# Patient Record
Sex: FEMALE | Race: WHITE | Employment: FULL TIME | URBAN - METROPOLITAN AREA
[De-identification: names, ages, dates, MRNs, and addresses within clinical notes are randomized per-mention and may not be internally consistent; named-entity substitution may affect disease eponyms.]

---

## 2024-11-01 ENCOUNTER — ANESTHESIA (OUTPATIENT)
Dept: OPERATING ROOM | Age: 54
End: 2024-11-01
Payer: COMMERCIAL

## 2024-11-01 ENCOUNTER — ANESTHESIA EVENT (OUTPATIENT)
Dept: OPERATING ROOM | Age: 54
End: 2024-11-01
Payer: COMMERCIAL

## 2024-11-01 ENCOUNTER — HOSPITAL ENCOUNTER (OUTPATIENT)
Age: 54
Setting detail: OUTPATIENT SURGERY
Discharge: HOME | End: 2024-11-01
Attending: PODIATRIST | Admitting: PODIATRIST
Payer: COMMERCIAL

## 2024-11-01 VITALS
TEMPERATURE: 97 F | HEIGHT: 64 IN | BODY MASS INDEX: 34.15 KG/M2 | RESPIRATION RATE: 14 BRPM | DIASTOLIC BLOOD PRESSURE: 64 MMHG | OXYGEN SATURATION: 96 % | SYSTOLIC BLOOD PRESSURE: 113 MMHG | WEIGHT: 200 LBS | HEART RATE: 62 BPM

## 2024-11-01 DIAGNOSIS — Z98.890 S/P FOOT SURGERY, LEFT: Primary | ICD-10-CM

## 2024-11-01 PROCEDURE — 2720000010 HC SURG SUPPLY STERILE: Performed by: PODIATRIST

## 2024-11-01 PROCEDURE — 2580000003 HC RX 258

## 2024-11-01 PROCEDURE — A4217 STERILE WATER/SALINE, 500 ML: HCPCS | Performed by: PODIATRIST

## 2024-11-01 PROCEDURE — 7100000010 HC PHASE II RECOVERY - FIRST 15 MIN: Performed by: PODIATRIST

## 2024-11-01 PROCEDURE — 3700000000 HC ANESTHESIA ATTENDED CARE: Performed by: PODIATRIST

## 2024-11-01 PROCEDURE — 7100000011 HC PHASE II RECOVERY - ADDTL 15 MIN: Performed by: PODIATRIST

## 2024-11-01 PROCEDURE — 6360000002 HC RX W HCPCS: Performed by: ANESTHESIOLOGY

## 2024-11-01 PROCEDURE — 2580000003 HC RX 258: Performed by: PODIATRIST

## 2024-11-01 PROCEDURE — 3700000001 HC ADD 15 MINUTES (ANESTHESIA): Performed by: PODIATRIST

## 2024-11-01 PROCEDURE — 3600000003 HC SURGERY LEVEL 3 BASE: Performed by: PODIATRIST

## 2024-11-01 PROCEDURE — 6360000002 HC RX W HCPCS: Performed by: PODIATRIST

## 2024-11-01 PROCEDURE — L4361 PNEUMA/VAC WALK BOOT PRE OTS: HCPCS | Performed by: PODIATRIST

## 2024-11-01 PROCEDURE — 6360000002 HC RX W HCPCS

## 2024-11-01 PROCEDURE — 2709999900 HC NON-CHARGEABLE SUPPLY: Performed by: PODIATRIST

## 2024-11-01 PROCEDURE — 2500000003 HC RX 250 WO HCPCS: Performed by: PODIATRIST

## 2024-11-01 PROCEDURE — 3600000013 HC SURGERY LEVEL 3 ADDTL 15MIN: Performed by: PODIATRIST

## 2024-11-01 RX ORDER — NALOXONE HYDROCHLORIDE 0.4 MG/ML
INJECTION, SOLUTION INTRAMUSCULAR; INTRAVENOUS; SUBCUTANEOUS PRN
Status: DISCONTINUED | OUTPATIENT
Start: 2024-11-01 | End: 2024-11-01 | Stop reason: HOSPADM

## 2024-11-01 RX ORDER — ATORVASTATIN CALCIUM 10 MG/1
10 TABLET, FILM COATED ORAL DAILY
COMMUNITY

## 2024-11-01 RX ORDER — PAROXETINE 40 MG/1
40 TABLET, FILM COATED ORAL EVERY MORNING
COMMUNITY

## 2024-11-01 RX ORDER — HYDROCODONE BITARTRATE AND ACETAMINOPHEN 5; 325 MG/1; MG/1
1 TABLET ORAL EVERY 4 HOURS PRN
Qty: 42 TABLET | Refills: 0 | Status: SHIPPED | OUTPATIENT
Start: 2024-11-01 | End: 2024-11-08

## 2024-11-01 RX ORDER — SODIUM CHLORIDE 0.9 % (FLUSH) 0.9 %
5-40 SYRINGE (ML) INJECTION PRN
Status: DISCONTINUED | OUTPATIENT
Start: 2024-11-01 | End: 2024-11-01 | Stop reason: HOSPADM

## 2024-11-01 RX ORDER — SODIUM CHLORIDE 0.9 % (FLUSH) 0.9 %
5-40 SYRINGE (ML) INJECTION EVERY 12 HOURS SCHEDULED
Status: DISCONTINUED | OUTPATIENT
Start: 2024-11-01 | End: 2024-11-01 | Stop reason: HOSPADM

## 2024-11-01 RX ORDER — SODIUM CHLORIDE, SODIUM LACTATE, POTASSIUM CHLORIDE, CALCIUM CHLORIDE 600; 310; 30; 20 MG/100ML; MG/100ML; MG/100ML; MG/100ML
INJECTION, SOLUTION INTRAVENOUS
Status: COMPLETED
Start: 2024-11-01 | End: 2024-11-01

## 2024-11-01 RX ORDER — SODIUM CHLORIDE 9 MG/ML
INJECTION, SOLUTION INTRAVENOUS PRN
Status: DISCONTINUED | OUTPATIENT
Start: 2024-11-01 | End: 2024-11-01 | Stop reason: HOSPADM

## 2024-11-01 RX ORDER — OXYCODONE HYDROCHLORIDE 5 MG/1
5 TABLET ORAL
Status: DISCONTINUED | OUTPATIENT
Start: 2024-11-01 | End: 2024-11-01 | Stop reason: HOSPADM

## 2024-11-01 RX ORDER — SODIUM CHLORIDE, SODIUM LACTATE, POTASSIUM CHLORIDE, CALCIUM CHLORIDE 600; 310; 30; 20 MG/100ML; MG/100ML; MG/100ML; MG/100ML
INJECTION, SOLUTION INTRAVENOUS CONTINUOUS
Status: DISCONTINUED | OUTPATIENT
Start: 2024-11-01 | End: 2024-11-01 | Stop reason: HOSPADM

## 2024-11-01 RX ORDER — MAGNESIUM HYDROXIDE 1200 MG/15ML
LIQUID ORAL CONTINUOUS PRN
Status: COMPLETED | OUTPATIENT
Start: 2024-11-01 | End: 2024-11-01

## 2024-11-01 RX ORDER — MEPERIDINE HYDROCHLORIDE 25 MG/ML
12.5 INJECTION INTRAMUSCULAR; INTRAVENOUS; SUBCUTANEOUS
Status: DISCONTINUED | OUTPATIENT
Start: 2024-11-01 | End: 2024-11-01 | Stop reason: HOSPADM

## 2024-11-01 RX ORDER — METOPROLOL SUCCINATE 25 MG/1
25 TABLET, EXTENDED RELEASE ORAL 2 TIMES DAILY
COMMUNITY

## 2024-11-01 RX ORDER — FENOFIBRATE 48 MG/1
48 TABLET, COATED ORAL DAILY
COMMUNITY

## 2024-11-01 RX ORDER — DIPHENHYDRAMINE HYDROCHLORIDE 50 MG/ML
12.5 INJECTION INTRAMUSCULAR; INTRAVENOUS
Status: DISCONTINUED | OUTPATIENT
Start: 2024-11-01 | End: 2024-11-01 | Stop reason: HOSPADM

## 2024-11-01 RX ORDER — ONDANSETRON 2 MG/ML
4 INJECTION INTRAMUSCULAR; INTRAVENOUS
Status: DISCONTINUED | OUTPATIENT
Start: 2024-11-01 | End: 2024-11-01 | Stop reason: HOSPADM

## 2024-11-01 RX ORDER — LIDOCAINE HYDROCHLORIDE 10 MG/ML
INJECTION, SOLUTION EPIDURAL; INFILTRATION; INTRACAUDAL; PERINEURAL PRN
Status: DISCONTINUED | OUTPATIENT
Start: 2024-11-01 | End: 2024-11-01 | Stop reason: ALTCHOICE

## 2024-11-01 RX ORDER — FENTANYL CITRATE 0.05 MG/ML
50 INJECTION, SOLUTION INTRAMUSCULAR; INTRAVENOUS EVERY 10 MIN PRN
Status: DISCONTINUED | OUTPATIENT
Start: 2024-11-01 | End: 2024-11-01 | Stop reason: HOSPADM

## 2024-11-01 RX ORDER — PROPOFOL 10 MG/ML
INJECTION, EMULSION INTRAVENOUS
Status: DISCONTINUED | OUTPATIENT
Start: 2024-11-01 | End: 2024-11-01 | Stop reason: SDUPTHER

## 2024-11-01 RX ORDER — BUPIVACAINE HYDROCHLORIDE 5 MG/ML
INJECTION, SOLUTION EPIDURAL; INTRACAUDAL PRN
Status: DISCONTINUED | OUTPATIENT
Start: 2024-11-01 | End: 2024-11-01 | Stop reason: ALTCHOICE

## 2024-11-01 RX ORDER — MIDAZOLAM HYDROCHLORIDE 1 MG/ML
INJECTION, SOLUTION INTRAMUSCULAR; INTRAVENOUS
Status: DISCONTINUED | OUTPATIENT
Start: 2024-11-01 | End: 2024-11-01 | Stop reason: SDUPTHER

## 2024-11-01 RX ORDER — M-VIT,TX,IRON,MINS/CALC/FOLIC 27MG-0.4MG
1 TABLET ORAL DAILY
COMMUNITY

## 2024-11-01 RX ORDER — FENTANYL CITRATE 0.05 MG/ML
INJECTION, SOLUTION INTRAMUSCULAR; INTRAVENOUS
Status: DISCONTINUED | OUTPATIENT
Start: 2024-11-01 | End: 2024-11-01 | Stop reason: SDUPTHER

## 2024-11-01 RX ORDER — METOCLOPRAMIDE HYDROCHLORIDE 5 MG/ML
10 INJECTION INTRAMUSCULAR; INTRAVENOUS
Status: DISCONTINUED | OUTPATIENT
Start: 2024-11-01 | End: 2024-11-01 | Stop reason: HOSPADM

## 2024-11-01 RX ORDER — CLINDAMYCIN PHOSPHATE 900 MG/50ML
900 INJECTION, SOLUTION INTRAVENOUS ONCE
Status: COMPLETED | OUTPATIENT
Start: 2024-11-01 | End: 2024-11-01

## 2024-11-01 RX ADMIN — PROPOFOL 100 MG: 10 INJECTION, EMULSION INTRAVENOUS at 07:36

## 2024-11-01 RX ADMIN — SODIUM CHLORIDE, POTASSIUM CHLORIDE, SODIUM LACTATE AND CALCIUM CHLORIDE: 600; 310; 30; 20 INJECTION, SOLUTION INTRAVENOUS at 06:50

## 2024-11-01 RX ADMIN — FENTANYL CITRATE 50 MCG: 0.05 INJECTION, SOLUTION INTRAMUSCULAR; INTRAVENOUS at 07:47

## 2024-11-01 RX ADMIN — PROPOFOL 100 MCG/KG/MIN: 10 INJECTION, EMULSION INTRAVENOUS at 07:37

## 2024-11-01 RX ADMIN — CLINDAMYCIN PHOSPHATE 900 MG: 900 INJECTION, SOLUTION INTRAVENOUS at 07:41

## 2024-11-01 RX ADMIN — SODIUM CHLORIDE, SODIUM LACTATE, POTASSIUM CHLORIDE, CALCIUM CHLORIDE: 600; 310; 30; 20 INJECTION, SOLUTION INTRAVENOUS at 06:50

## 2024-11-01 RX ADMIN — MIDAZOLAM HYDROCHLORIDE 2 MG: 2 INJECTION, SOLUTION INTRAMUSCULAR; INTRAVENOUS at 07:34

## 2024-11-01 ASSESSMENT — PAIN SCALES - GENERAL
PAINLEVEL_OUTOF10: 0

## 2024-11-01 ASSESSMENT — PAIN - FUNCTIONAL ASSESSMENT: PAIN_FUNCTIONAL_ASSESSMENT: 0-10

## 2024-11-01 NOTE — H&P
PODIATRIC HISTORY AND PHYSICAL UPDATE                                            EVALUATION DATE: 11/1/2024   EVALUATION TIME: 7:01 AM    The documented History and Physical (completed in the past 30 days) has been reviewed and the patient had a confirmatory musculoskeletal exam performed. The contents of the pre-operative assesment accurately reflect the patient's condition with the following additions or revisions since the H&P was completed:  No changes.    This H&P can be found in the outside record dated 10/29/24.    SIGNATURE: Abelino Rivera DPM PATIENT NAME: Tena Portillo   DATE: November 1, 2024 MRN: [id number]

## 2024-11-01 NOTE — PROGRESS NOTES
PODIATRIC PRE-OPERATIVE NOTE                                 SERVICE DATE: 11/1/2024    SERVICE TIME:  7:02 AM    DIAGNOSIS: plantar fasciitis, left foot    PROCEDURE(S): plantar fasciotomy, left foot via radiofrequency ablation    Consent on chart: Yes    LABS:  CBC:  No results found for: \"WBC\", \"HGB\", \"HCT\", \"MCV\", \"PLT\"    CMP:  No results found for: \"NA\", \"K\", \"CL\", \"CO2\", \"BUN\", \"CREATININE\", \"GLUCOSE\", \"CALCIUM\"    COAGS:  No results found for: \"LABPROT\", \"LABALBU\"    URINALYSIS:  No components found for: \"UKET\", \"NITRITES\", \"SPGR\", \"UPROT\", \"LEUKEST\", \"UWBC\", \"UBACTERIA\"     Type & screen:  No    Medical Clearance:  Yes    CXR/EKG:  No    Medications/Preop Antibiotics: 2g IV Ancef     Allergies   Allergen Reactions    Augmentin [Amoxicillin-Pot Clavulanate] Hives       Surgical site identified:  Yes    NPO:  Yes    IV Fluids:  Yes    Risks and benefits, complications, treatment options, expected outcome and rehabilitation explained,  patient understands.  All questions were entertained and answered.  Patient wishes to proceed with above procedure(s).    SIGNATURE: Abelino Rivera DPM PATIENT NAME: Tena Portillo   DATE: November 1, 2024 MRN: [id number]   TIME: 7:02 AM PAGER: [phone]

## 2024-11-01 NOTE — OP NOTE
Operative Note      Patient: Tena Portillo  YOB: 1970  MRN: [id number]    Date of Procedure: 11/1/2024    Pre-Op Diagnosis Codes:      * Plantar fasciitis, bilateral [M72.2]     * Left foot pain [M79.672]    Post-Op Diagnosis: Same       Procedure(s):  PLANTAR FASCIOTOMY LEFT FOOT VIA RADIOFREQUENCY COBLATION    Surgeon(s):  Laura Rodriguez DPM    Assistant:   Abelino Rivera DPM, PGY-3    Anesthesia: Monitor Anesthesia Care with local block 10 cc of 1:1 Marcaine/lidocaine    Estimated Blood Loss (mL): Minimal    Complications: None    Specimens:   * No specimens in log *    Implants:  * No implants in log *      Drains: No    Findings:  Infection Present At Time Of Surgery (PATOS) (choose all levels that have infection present):  No infection present  Other Findings: Consistent with postoperative findings    OPERATIVE INDICATIONS: This is a pleasant 54yoF with a history of painful plantar fasciitis of left foot unresponsive to conservative management. all A/B/R/C/P were discussed in detail with the patient. Answered all of the patient's questions to the patient's satisfaction. No guarantees were given. The patient understands this. Patient elects to proceed with surgery.       OPERATIVE PROCEDURE: Patient was transferred from the preoperative holding area to the operative suite and placed in a supine position. The point of maximal tenderness on the patients left plantar heel was marked with a skin marker. A grid of 24 marks were placed centered around the point of maximal tenderness at the plantar fascial insertion. All monitors were applied.  MAC anesthesia was administered. Prophylaxis was obtained with 900 mg clindamycin IV piggyback pre-operatively.  Local block obtained in a V fashion at the left plantar heel with injectables mentioned above.  The left foot, ankle, and leg were then prepped and draped in the normal sterile fashion. Time out was performed.     Attention was then directed to the

## 2024-11-01 NOTE — DISCHARGE INSTRUCTIONS
___________________________________________________________________    FLUIDS AND DIET:  An upset stomach or feeling sick (nausea) can commonly occur after surgery and/or pain medication use. To help minimize nausea:  Do not eat a heavy meal soon after your surgery,    Start with water or other clear liquids,  Advance to mild or bland items like Jell-O, dry toast, crackers, etc.,  Avoid caffeine,  Do not drink alcohol for at least 24 hours after surgery,  Your physician may prescribe anti-nausea medication if your nausea continues,  If you are free from nausea for 24hrs, you can advance to your normal diet as tolerated.    OPERATIVE SITE:  A small amount of bleeding or drainage after surgery is normal. Your physician will provide you with specific instructions on how to care for your surgical site and/or dressing.   Try not to touch your surgical site unless necessary,   Always wash your hands BEFORE and AFTER changing your dressing if instructed by your physician,   Proper handwashing includes wetting your hands with clean water, applying soap, lathering your hands by rubbing them together with soap for 20 seconds, rinsing them with clean water, and drying them with a clean towel. If soap and water is not available, alcohol-based  may be applied by rubbing the hands together and allowing them to dry for 20 seconds.  Special Instructions: __________________________________________________________________      PAIN:  Pain after surgery is normal and should be expected. When you go home, the anesthesia wears off, and you may experience increased discomfort. Your provider will give you specific instructions on what pain medication to take at home. Listed below is additional information on treating pain after surgery:  Pain medication can give you an upset stomach. Unless your provider has instructed you not to eat or drink, the pain medication should be taken with a small amount of food. Eating will decrease

## 2024-11-01 NOTE — ANESTHESIA PRE PROCEDURE
Department of Anesthesiology  Preprocedure Note       Name:  Tena Portillo   Age:  54 y.o.  :  1970                                          MRN:  [id number]         Date:  2024      Surgeon: Surgeon(s):  Laura Rodriguez DPM    Procedure: Procedure(s):  PLANTAR FASCIOTOMY LEFT VIA RADIOFREQUENCY COBLATION TOPAZ, 100 CC SALINE (OUTSIDE [facility] DR. IVEY 10/29)    Medications prior to admission:   Prior to Admission medications    Medication Sig Start Date End Date Taking? Authorizing Provider   atorvastatin (LIPITOR) 10 MG tablet Take 1 tablet by mouth daily   Yes Kiara Quarles MD   fenofibrate (TRICOR) 48 MG tablet Take 1 tablet by mouth daily   Yes ProviderKiara MD   PARoxetine (PAXIL) 40 MG tablet Take 1 tablet by mouth every morning   Yes Kiara Quarles MD   omeprazole (PRILOSEC) 20 MG delayed release capsule Take 2 capsules by mouth daily   Yes ProviderKiara MD   estrogens, conjugated, (PREMARIN) 0.3 MG tablet Take 1 tablet by mouth daily   Yes Kiara Quarles MD   metoprolol succinate (TOPROL XL) 25 MG extended release tablet Take 1 tablet by mouth 2 times daily   Yes ProviderKiara MD   Multiple Vitamins-Minerals (THERAPEUTIC MULTIVITAMIN-MINERALS) tablet Take 1 tablet by mouth daily   Yes ProviderKiara MD       Current medications:    Current Facility-Administered Medications   Medication Dose Route Frequency Provider Last Rate Last Admin   • sodium chloride flush 0.9 % injection 5-40 mL  5-40 mL IntraVENous 2 times per day Abelino Rivera DPM       • sodium chloride flush 0.9 % injection 5-40 mL  5-40 mL IntraVENous PRN Abelino Rivera DPM       • 0.9 % sodium chloride infusion   IntraVENous PRN Abelino Rivera DPM       • lactated ringers infusion   IntraVENous Continuous Abelino Rivera DPM       • ceFAZolin (ANCEF) 2,000 mg in sodium chloride 0.9 % 100 mL IVPB  2,000 mg IntraVENous On Call to OR Abelino Rivera DPM       • lactated ringers

## 2024-11-01 NOTE — ANESTHESIA POSTPROCEDURE EVALUATION
Department of Anesthesiology  Postprocedure Note    Patient: Tena Portillo  MRN: [id number]  YOB: 1970  Date of evaluation: 11/1/2024    Procedure Summary       Date: 11/01/24 Room / Location: [address]    Anesthesia Start: 0734 Anesthesia Stop:     Procedure: PLANTAR FASCIOTOMY LEFT VIA RADIOFREQUENCY COBLATION TOPAZ, 100 CC SALINE (OUTSIDE [facility] DR. IVEY 10/29) (Left) Diagnosis:       Plantar fasciitis, bilateral      Left foot pain      (Plantar fasciitis, bilateral [M72.2])      (Left foot pain [M79.672])    Surgeons: Laura Rodriguez DPM Responsible Provider:     Anesthesia Type: MAC ASA Status: 3            Anesthesia Type: No value filed.    Sudha Phase I: Sudha Score: 10    Sudha Phase II:      Anesthesia Post Evaluation    Patient location during evaluation: PACU  Patient participation: complete - patient participated  Level of consciousness: awake  Pain score: 0  Airway patency: patent  Nausea & Vomiting: no nausea  Cardiovascular status: blood pressure returned to baseline  Respiratory status: acceptable  Hydration status: euvolemic  Pain management: adequate    No notable events documented.

## 2024-11-01 NOTE — PROGRESS NOTES
Pt admitted to Pacu, bed 2.  Report received from OR nurse and anesthesia.  Resident MD at bedside for report.  Pt sleeping upon arrival.  VSS.  Pulse ox 100% on 8L O2 vis NRB.  IV infusing without difficulty.  Left foot dressing clean, dry, and intact.  Toes pink in color, warm to touch, cap refill less than 3 sec.  SHARON pedal pulse due to dressing.  Ice applied.

## 2024-12-26 ENCOUNTER — ANESTHESIA EVENT (OUTPATIENT)
Dept: OPERATING ROOM | Age: 54
End: 2024-12-26
Payer: COMMERCIAL

## 2024-12-26 NOTE — ANESTHESIA PRE PROCEDURE
Department of Anesthesiology  Preprocedure Note       Name:  Tena Portillo   Age:  54 y.o.  :  1970                                          MRN:  [id number]         Date:  2024      Surgeon: Surgeon(s):  Laura Rodriguez DPM    Procedure: Procedure(s):  PLANTAR FASCIOTOMY RIGHT FOOT TOPAZ, 100CC SALINE   (OUTSIDE [facility] DR. IVEY)    Medications prior to admission:   Prior to Admission medications    Medication Sig Start Date End Date Taking? Authorizing Provider   atorvastatin (LIPITOR) 10 MG tablet Take 1 tablet by mouth daily    Kiara Quarles MD   fenofibrate (TRICOR) 48 MG tablet Take 1 tablet by mouth daily    Kiara Quarles MD   PARoxetine (PAXIL) 40 MG tablet Take 1 tablet by mouth every morning    Kiara Quarles MD   omeprazole (PRILOSEC) 20 MG delayed release capsule Take 2 capsules by mouth daily    Kiara Quarles MD   estrogens, conjugated, (PREMARIN) 0.3 MG tablet Take 1 tablet by mouth daily    Kiara Quarles MD   metoprolol succinate (TOPROL XL) 25 MG extended release tablet Take 1 tablet by mouth 2 times daily    Kiara Quarles MD   Multiple Vitamins-Minerals (THERAPEUTIC MULTIVITAMIN-MINERALS) tablet Take 1 tablet by mouth daily    Kiara Quarles MD       Current medications:    No current facility-administered medications for this encounter.     Current Outpatient Medications   Medication Sig Dispense Refill   • atorvastatin (LIPITOR) 10 MG tablet Take 1 tablet by mouth daily     • fenofibrate (TRICOR) 48 MG tablet Take 1 tablet by mouth daily     • PARoxetine (PAXIL) 40 MG tablet Take 1 tablet by mouth every morning     • omeprazole (PRILOSEC) 20 MG delayed release capsule Take 2 capsules by mouth daily     • estrogens, conjugated, (PREMARIN) 0.3 MG tablet Take 1 tablet by mouth daily     • metoprolol succinate (TOPROL XL) 25 MG extended release tablet Take 1 tablet by mouth 2 times daily     • Multiple Vitamins-Minerals (THERAPEUTIC

## 2024-12-27 ENCOUNTER — HOSPITAL ENCOUNTER (OUTPATIENT)
Age: 54
Setting detail: OUTPATIENT SURGERY
Discharge: HOME | End: 2024-12-27
Attending: PODIATRIST | Admitting: PODIATRIST
Payer: COMMERCIAL

## 2024-12-27 ENCOUNTER — ANESTHESIA (OUTPATIENT)
Dept: OPERATING ROOM | Age: 54
End: 2024-12-27
Payer: COMMERCIAL

## 2024-12-27 VITALS
OXYGEN SATURATION: 98 % | SYSTOLIC BLOOD PRESSURE: 132 MMHG | RESPIRATION RATE: 14 BRPM | DIASTOLIC BLOOD PRESSURE: 64 MMHG | WEIGHT: 210 LBS | HEART RATE: 60 BPM | TEMPERATURE: 97 F | BODY MASS INDEX: 35.85 KG/M2 | HEIGHT: 64 IN

## 2024-12-27 DIAGNOSIS — G89.18 POST-OP PAIN: Primary | ICD-10-CM

## 2024-12-27 PROCEDURE — 6360000002 HC RX W HCPCS: Performed by: PODIATRIST

## 2024-12-27 PROCEDURE — 3600000013 HC SURGERY LEVEL 3 ADDTL 15MIN: Performed by: PODIATRIST

## 2024-12-27 PROCEDURE — 7100000010 HC PHASE II RECOVERY - FIRST 15 MIN: Performed by: PODIATRIST

## 2024-12-27 PROCEDURE — 3600000003 HC SURGERY LEVEL 3 BASE: Performed by: PODIATRIST

## 2024-12-27 PROCEDURE — 7100000011 HC PHASE II RECOVERY - ADDTL 15 MIN: Performed by: PODIATRIST

## 2024-12-27 PROCEDURE — 3700000001 HC ADD 15 MINUTES (ANESTHESIA): Performed by: PODIATRIST

## 2024-12-27 PROCEDURE — 2580000003 HC RX 258: Performed by: PODIATRIST

## 2024-12-27 PROCEDURE — 3700000000 HC ANESTHESIA ATTENDED CARE: Performed by: PODIATRIST

## 2024-12-27 PROCEDURE — 2500000003 HC RX 250 WO HCPCS: Performed by: PODIATRIST

## 2024-12-27 PROCEDURE — 6360000002 HC RX W HCPCS: Performed by: STUDENT IN AN ORGANIZED HEALTH CARE EDUCATION/TRAINING PROGRAM

## 2024-12-27 PROCEDURE — 2709999900 HC NON-CHARGEABLE SUPPLY: Performed by: PODIATRIST

## 2024-12-27 PROCEDURE — 2720000010 HC SURG SUPPLY STERILE: Performed by: PODIATRIST

## 2024-12-27 PROCEDURE — 6370000000 HC RX 637 (ALT 250 FOR IP): Performed by: PODIATRIST

## 2024-12-27 RX ORDER — SODIUM CHLORIDE, SODIUM LACTATE, POTASSIUM CHLORIDE, CALCIUM CHLORIDE 600; 310; 30; 20 MG/100ML; MG/100ML; MG/100ML; MG/100ML
INJECTION, SOLUTION INTRAVENOUS CONTINUOUS
Status: DISCONTINUED | OUTPATIENT
Start: 2024-12-27 | End: 2024-12-27 | Stop reason: HOSPADM

## 2024-12-27 RX ORDER — PROPOFOL 10 MG/ML
INJECTION, EMULSION INTRAVENOUS
Status: DISCONTINUED | OUTPATIENT
Start: 2024-12-27 | End: 2024-12-27 | Stop reason: SDUPTHER

## 2024-12-27 RX ORDER — NALOXONE HYDROCHLORIDE 0.4 MG/ML
INJECTION, SOLUTION INTRAMUSCULAR; INTRAVENOUS; SUBCUTANEOUS PRN
Status: CANCELLED | OUTPATIENT
Start: 2024-12-27

## 2024-12-27 RX ORDER — HYDRALAZINE HYDROCHLORIDE 20 MG/ML
10 INJECTION INTRAMUSCULAR; INTRAVENOUS
Status: CANCELLED | OUTPATIENT
Start: 2024-12-27

## 2024-12-27 RX ORDER — LIDOCAINE HYDROCHLORIDE 10 MG/ML
INJECTION, SOLUTION EPIDURAL; INFILTRATION; INTRACAUDAL; PERINEURAL PRN
Status: DISCONTINUED | OUTPATIENT
Start: 2024-12-27 | End: 2024-12-27 | Stop reason: ALTCHOICE

## 2024-12-27 RX ORDER — FENTANYL CITRATE 0.05 MG/ML
50 INJECTION, SOLUTION INTRAMUSCULAR; INTRAVENOUS EVERY 5 MIN PRN
Status: CANCELLED | OUTPATIENT
Start: 2024-12-27

## 2024-12-27 RX ORDER — SODIUM CHLORIDE 0.9 % (FLUSH) 0.9 %
5-40 SYRINGE (ML) INJECTION EVERY 12 HOURS SCHEDULED
Status: CANCELLED | OUTPATIENT
Start: 2024-12-27

## 2024-12-27 RX ORDER — DIPHENHYDRAMINE HYDROCHLORIDE 50 MG/ML
12.5 INJECTION INTRAMUSCULAR; INTRAVENOUS
Status: CANCELLED | OUTPATIENT
Start: 2024-12-27 | End: 2024-12-28

## 2024-12-27 RX ORDER — MIDAZOLAM HYDROCHLORIDE 1 MG/ML
INJECTION, SOLUTION INTRAMUSCULAR; INTRAVENOUS
Status: DISCONTINUED | OUTPATIENT
Start: 2024-12-27 | End: 2024-12-27 | Stop reason: SDUPTHER

## 2024-12-27 RX ORDER — SODIUM CHLORIDE, SODIUM LACTATE, POTASSIUM CHLORIDE, CALCIUM CHLORIDE 600; 310; 30; 20 MG/100ML; MG/100ML; MG/100ML; MG/100ML
INJECTION, SOLUTION INTRAVENOUS CONTINUOUS
Status: CANCELLED | OUTPATIENT
Start: 2024-12-27

## 2024-12-27 RX ORDER — PROCHLORPERAZINE EDISYLATE 5 MG/ML
5 INJECTION INTRAMUSCULAR; INTRAVENOUS
Status: CANCELLED | OUTPATIENT
Start: 2024-12-27 | End: 2024-12-28

## 2024-12-27 RX ORDER — DROPERIDOL 2.5 MG/ML
0.62 INJECTION, SOLUTION INTRAMUSCULAR; INTRAVENOUS
Status: CANCELLED | OUTPATIENT
Start: 2024-12-27 | End: 2024-12-28

## 2024-12-27 RX ORDER — MAGNESIUM HYDROXIDE 1200 MG/15ML
LIQUID ORAL CONTINUOUS PRN
Status: COMPLETED | OUTPATIENT
Start: 2024-12-27 | End: 2024-12-27

## 2024-12-27 RX ORDER — SODIUM CHLORIDE 9 MG/ML
INJECTION, SOLUTION INTRAVENOUS PRN
Status: CANCELLED | OUTPATIENT
Start: 2024-12-27

## 2024-12-27 RX ORDER — GINSENG 100 MG
CAPSULE ORAL PRN
Status: DISCONTINUED | OUTPATIENT
Start: 2024-12-27 | End: 2024-12-27 | Stop reason: ALTCHOICE

## 2024-12-27 RX ORDER — SODIUM CHLORIDE 0.9 % (FLUSH) 0.9 %
5-40 SYRINGE (ML) INJECTION PRN
Status: CANCELLED | OUTPATIENT
Start: 2024-12-27

## 2024-12-27 RX ORDER — BUPIVACAINE HYDROCHLORIDE AND EPINEPHRINE 5; 5 MG/ML; UG/ML
INJECTION, SOLUTION EPIDURAL; INTRACAUDAL; PERINEURAL PRN
Status: DISCONTINUED | OUTPATIENT
Start: 2024-12-27 | End: 2024-12-27 | Stop reason: ALTCHOICE

## 2024-12-27 RX ORDER — OXYCODONE AND ACETAMINOPHEN 5; 325 MG/1; MG/1
1 TABLET ORAL EVERY 6 HOURS PRN
Qty: 20 TABLET | Refills: 0 | Status: SHIPPED | OUTPATIENT
Start: 2024-12-27 | End: 2025-01-01

## 2024-12-27 RX ADMIN — SODIUM CHLORIDE, POTASSIUM CHLORIDE, SODIUM LACTATE AND CALCIUM CHLORIDE: 600; 310; 30; 20 INJECTION, SOLUTION INTRAVENOUS at 07:30

## 2024-12-27 RX ADMIN — PROPOFOL 100 MCG/KG/MIN: 10 INJECTION, EMULSION INTRAVENOUS at 07:35

## 2024-12-27 RX ADMIN — MIDAZOLAM HYDROCHLORIDE 2 MG: 2 INJECTION, SOLUTION INTRAMUSCULAR; INTRAVENOUS at 07:33

## 2024-12-27 RX ADMIN — PROPOFOL 50 MG: 10 INJECTION, EMULSION INTRAVENOUS at 07:34

## 2024-12-27 ASSESSMENT — PAIN SCALES - GENERAL
PAINLEVEL_OUTOF10: 0
PAINLEVEL_OUTOF10: 0

## 2024-12-27 ASSESSMENT — PAIN - FUNCTIONAL ASSESSMENT: PAIN_FUNCTIONAL_ASSESSMENT: 0-10

## 2024-12-27 ASSESSMENT — PAIN DESCRIPTION - DESCRIPTORS: DESCRIPTORS: TENDER;SORE

## 2024-12-27 NOTE — ANESTHESIA POSTPROCEDURE EVALUATION
Department of Anesthesiology  Postprocedure Note    Patient: Tena Portillo  MRN: [id number]  YOB: 1970  Date of evaluation: 12/27/2024    Procedure Summary       Date: 12/27/24 Room / Location: [address]    Anesthesia Start: 0730 Anesthesia Stop:     Procedure: PLANTAR FASCIOTOMY RIGHT FOOT TOPAZ, 100CC SALINE   (OUTSIDE [facility]  DR. IVEY) (Right: Foot) Diagnosis:       Plantar fascial fibromatosis      Pain of toe of right foot      (Plantar fascial fibromatosis [M72.2])      (Pain of toe of right foot [M79.674])    Surgeons: Laura Rodriguez DPM Responsible Provider: Bharat Lord DO    Anesthesia Type: MAC ASA Status: 2            Anesthesia Type: No value filed.    Sudha Phase I: Sudha Score: 10    Sudha Phase II:      Anesthesia Post Evaluation    Patient location during evaluation: PACU  Patient participation: complete - patient cannot participate  Level of consciousness: awake and alert  Pain score: 0  Airway patency: patent  Nausea & Vomiting: no nausea and no vomiting  Cardiovascular status: blood pressure returned to baseline  Respiratory status: room air, spontaneous ventilation and acceptable  Hydration status: stable  Multimodal analgesia pain management approach    There were no known notable events for this encounter.

## 2024-12-27 NOTE — DISCHARGE INSTRUCTIONS
POST OPERATIVE INSTRUCTIONS    RESTRICTIONS AFTER ANESTHESIA:   - Do not drive, work with heavy equipment or sign legal documents for 24 hours  - Rest at home for 24 hours following anesthesia  - You may experience light-headedness, dizziness, nausea, or sleepiness after surgery.    Stay in bed if you experience these symptoms  - Do not stay alone. A responsible adult must be with you for 24 hours.   - Do not take any alcoholic beverages or sleeping pills for the next 18 hours  - You may experience muscle aches and sore throat  - If you experience difficulty breathing and/or shortness of breath, seek IMMEDIATE  medical attention.     DRESSING: Keep surgical area clean and dry. Do NOT remove dressing. You may notice blood upon your bandage. Bleeding is expected and your bandage may become stained. You may reinforce with gauze or ace bandage.     BATHING: Sponge bath only or use of a cast protector in the shower until first post op visit.    ACTIVITY:   - After discharge from the surgery center, go directly home and limit your activities.  - Keep children and pets away from your operative foot.  - No heavy lifting.  - Wear surgical boot when walking.    ELEVATION: Elevate the operative site above the level of your heart for at least the next 3 days. This will help decrease pain and prevent swelling. Support the back of your knee with a pillow.     ICE: Use ice pack behind right knee or at right ankle for 20 minutes on and off every hour when awake for the next 3 days. Do NOT fall asleep while using the ice pack.     MEDICATION: Some degree of discomfort is to be expected after surgery and will improve gradually as the healing process continues. Pain medication has been prescribed for you.   - Please take your pain medication as prescribed. Take with food. Set an alarm to take a dose of medication overnight.   - Do not drink alcohol, drive a car, operate any machinery, or work with heavy equipment while taking your

## 2024-12-27 NOTE — BRIEF OP NOTE
Brief Postoperative Note      Patient: Tena Portillo  YOB: 1970  MRN: [id number]    Date of Procedure: 12/27/2024    Pre-Op Diagnosis Codes:      * Plantar fascial fibromatosis [M72.2]     * Pain of toe of right foot [M79.674]    Post-Op Diagnosis: Same       Procedure(s):  PLANTAR FASCIOTOMY RIGHT FOOT TOPAZ, 100CC SALINE   (OUTSIDE [facility] DR[facility] IVEY)    Surgeon(s):  Laura Rodriguez DPM    Assistant:  Resident: Yaw Chaney DPM    Anesthesia: Monitor Anesthesia Care    Estimated Blood Loss (mL): Minimal    Complications: None    Specimens:   * No specimens in log *    Implants:  * No implants in log *      Drains: * No LDAs found *    Findings:  Infection Present At Time Of Surgery (PATOS) (choose all levels that have infection present):  No infection present  Other Findings: See operative report.     Electronically signed by Yaw Chaney DPM on 12/27/2024 at 8:11 AM

## 2024-12-27 NOTE — H&P
PODIATRIC HISTORY AND PHYSICAL UPDATE                                            EVALUATION DATE: 12/27/2024   EVALUATION TIME: 7:16 AM    The documented History and Physical (completed in the past 30 days) has been reviewed and the patient had a confirmatory musculoskeletal exam performed. The contents of the pre-operative assesment accurately reflect the patient's condition with the following additions or revisions since the H&P was completed:  No changes.    Physical Exam:    No distress. alert and oriented to person, place, and time    HENT: Normal cephalic and Atraumatic   Neck: Thyroid normal. No JVD present. No neck adenopathy. No thyromegaly present.   Cardiovascular: Normal rate, regular rhythm, normal heart sounds, intact distal pulses and normal pulses.   Pulmonary/Chest: Effort normal and breath sounds normal. has no wheezes. has no rales. exhibits no tenderness.   Abdominal: Soft. Bowel sounds are normal. There is no abdominal tenderness.   Musculoskeletal:         General: No tenderness or edema. Normal range of motion.      Cervical back: Normal range of motion and neck supple.     This H&P can be found in the record dated 12/26/24.    SIGNATURE: Yaw Chaney DPM PATIENT NAME: Tena Protillo   DATE: December 27, 2024 MRN: [id number]

## 2024-12-27 NOTE — PROGRESS NOTES
Patient ID:  Tena Portillo  [id number]  54 y.o.  1970  Patient received in PACU BED 2 Report received from Anesthesia and Surgical Nurse.   Attached to Monitor, VSS, See Nursing Assessment and Flowsheets for detailed Information  Awake, following commands, answering questions appropriately. O2 Sats>92 on Room air  Taking po fluids well, VSS.  IV Discontinued per protocol, catheter intact, patient tolerated well.  Discharge Instructions given, patient verbalizes and understanding.  Pt to be discharged to home with responsible adult     Electronically signed by Milli Garcia RN on 12/27/24

## 2024-12-27 NOTE — OP NOTE
Operative Note      Patient: Tena Portillo  YOB: 1970  MRN: [id number]    Date of Procedure: 12/27/2024    Pre-Op Diagnosis Codes:      * Plantar fascial fibromatosis [M72.2]     * Pain of toe of right foot [M79.674]    Post-Op Diagnosis: Same       Procedure(s):  PLANTAR FASCIOTOMY RIGHT FOOT TOPAZ, 100CC SALINE   (OUTSIDE [facility] DR[facility] IVEY)    Surgeon(s):  Laura Rodriguez DPM    Assistant:   Resident: Yaw Chaney DPM    Anesthesia: Monitor Anesthesia Care with local block 10 ccs of 1:1 marcaine/lidocaine     Estimated Blood Loss (mL): Minimal    Complications: None    Specimens:   * No specimens in log *    Implants:  * No implants in log *      Drains: * No LDAs found *    Findings:  Infection Present At Time Of Surgery (PATOS) (choose all levels that have infection present):  No infection present  Other Findings: See operative report    Detailed Description of Procedure:     Patient was transferred from the preoperative holding area to the operative suite and placed in a supine position. The point of maximal tenderness on the patients right plantar heel was marked with a skin marker. A grid of 24 marks were placed centered around the point of maximal tenderness at the plantar fascial insertion. All monitors were applied.  MAC anesthesia was administered. Local block obtained in a V fashion at the right plantar heel with injectables mentioned above.  The right foot, ankle, and leg were then prepped and draped in the normal sterile fashion. Time out was performed.      Attention was then directed to the plantar aspect of right heel where 24, 3mm stab incisions were planned and performed with  an 11 blade scalpel, all spaced 3 to 5 mm apart.  Incision was made with a 11 blade extending towards subcutaneous layer.  The topaz radiofrequency coblation wand was then inserted through each incision, contacting the plantar fascial band. The device was activated at level 4 and the fascia was perforated at

## 2025-05-07 ENCOUNTER — PRE-ADMISSION TESTING (OUTPATIENT)
Dept: PREADMISSION TESTING | Facility: HOSPITAL | Age: 55
End: 2025-05-07
Payer: COMMERCIAL

## 2025-05-07 VITALS
OXYGEN SATURATION: 96 % | DIASTOLIC BLOOD PRESSURE: 74 MMHG | HEART RATE: 73 BPM | SYSTOLIC BLOOD PRESSURE: 132 MMHG | RESPIRATION RATE: 16 BRPM | HEIGHT: 64 IN | WEIGHT: 218.26 LBS | BODY MASS INDEX: 37.26 KG/M2 | TEMPERATURE: 97.3 F

## 2025-05-07 DIAGNOSIS — Z01.818 PRE-OP TESTING: Primary | ICD-10-CM

## 2025-05-07 PROCEDURE — 87081 CULTURE SCREEN ONLY: CPT | Mod: PARLAB

## 2025-05-07 PROCEDURE — 99204 OFFICE O/P NEW MOD 45 MIN: CPT | Performed by: NURSE PRACTITIONER

## 2025-05-07 RX ORDER — PAROXETINE HYDROCHLORIDE HEMIHYDRATE 37.5 MG/1
37.5 TABLET, FILM COATED, EXTENDED RELEASE ORAL DAILY
COMMUNITY

## 2025-05-07 RX ORDER — MULTIVITAMIN
1 TABLET ORAL
COMMUNITY

## 2025-05-07 RX ORDER — HYDROCODONE BITARTRATE AND ACETAMINOPHEN 5; 325 MG/1; MG/1
TABLET ORAL
COMMUNITY
Start: 2024-11-01 | End: 2025-05-07 | Stop reason: ALTCHOICE

## 2025-05-07 RX ORDER — OMEPRAZOLE 40 MG/1
CAPSULE, DELAYED RELEASE ORAL
COMMUNITY
Start: 2015-11-16

## 2025-05-07 RX ORDER — ESTROGENS, CONJUGATED 1.25 MG/1
1 TABLET, FILM COATED ORAL
COMMUNITY
Start: 2025-01-01 | End: 2025-05-07 | Stop reason: ALTCHOICE

## 2025-05-07 RX ORDER — ATORVASTATIN CALCIUM 10 MG/1
10 TABLET, FILM COATED ORAL DAILY
COMMUNITY

## 2025-05-07 RX ORDER — FENOFIBRATE 145 MG/1
145 TABLET, FILM COATED ORAL
COMMUNITY

## 2025-05-07 RX ORDER — ESTRADIOL 2 MG/1
1 TABLET ORAL
COMMUNITY
Start: 2025-04-05

## 2025-05-07 RX ORDER — METOPROLOL SUCCINATE 25 MG/1
1 TABLET, EXTENDED RELEASE ORAL 2 TIMES DAILY
COMMUNITY
Start: 2024-06-12

## 2025-05-07 RX ORDER — MULTIVIT-MIN/FOLIC/VIT K/LYCOP 400-300MCG
70 TABLET ORAL DAILY
COMMUNITY

## 2025-05-07 RX ORDER — PAROXETINE HYDROCHLORIDE 40 MG/1
40 TABLET, FILM COATED ORAL DAILY
COMMUNITY
End: 2025-05-07 | Stop reason: ALTCHOICE

## 2025-05-07 RX ORDER — BISMUTH SUBSALICYLATE 262 MG
1 TABLET,CHEWABLE ORAL DAILY
COMMUNITY

## 2025-05-07 ASSESSMENT — DUKE ACTIVITY SCORE INDEX (DASI)
CAN YOU DO LIGHT WORK AROUND THE HOUSE LIKE DUSTING OR WASHING DISHES: YES
CAN YOU WALK INDOORS, SUCH AS AROUND YOUR HOUSE: YES
CAN YOU DO HEAVY WORK AROUND THE HOUSE LIKE SCRUBBING FLOORS OR LIFTING AND MOVING HEAVY FURNITURE: YES
CAN YOU HAVE SEXUAL RELATIONS: YES
CAN YOU PARTICIPATE IN STRENOUS SPORTS LIKE SWIMMING, SINGLES TENNIS, FOOTBALL, BASKETBALL, OR SKIING: NO
CAN YOU TAKE CARE OF YOURSELF (EAT, DRESS, BATHE, OR USE TOILET): YES
TOTAL_SCORE: 44.7
DASI METS SCORE: 8.2
CAN YOU PARTICIPATE IN MODERATE RECREATIONAL ACTIVITIES LIKE GOLF, BOWLING, DANCING, DOUBLES TENNIS OR THROWING A BASEBALL OR FOOTBALL: NO
CAN YOU DO YARD WORK LIKE RAKING LEAVES, WEEDING OR PUSHING A MOWER: YES
CAN YOU DO MODERATE WORK AROUND THE HOUSE LIKE VACUUMING, SWEEPING FLOORS OR CARRYING GROCERIES: YES
CAN YOU WALK A BLOCK OR TWO ON LEVEL GROUND: YES
CAN YOU CLIMB A FLIGHT OF STAIRS OR WALK UP A HILL: YES
CAN YOU RUN A SHORT DISTANCE: YES

## 2025-05-07 NOTE — CPM/PAT H&P
"CPM/PAT Evaluation       Name: Cyndi Roa (Cyndi Roa)  /Age: 1970/55 y.o.     In-Person       Chief Complaint:     55 yr old female presents to [facility] for pre-operative evaluation, with c/o rotator cuff repair  LEFT SOULDER ARTHROSCOPIC ROTATOR CUFF REPAIR/ SUBACROMIAL SPACE DECOMPRESSION /  BICEPS TENDON TENODESIS /DEBRIDEMENT  is Scheduled on  2025 with Dr. Dozier    The patient has the following past medical history:  GERD, HLD/HTN, anxiety, depression      Chief complaint:  She states she gets calcium deposits in the shoulder that cause pain  She reports c/o left shoulder pain x  1 year+ that is worsening  She states she was hurt at work ---> had tendonitis to left forearm and xrays demonstrated rotator cuff tear   Pain located lateral and posterior aspect of left shoulder   Pain worsens with all movements:  lifting, dressing, driving  + weakness to LUE    She is managing pain with rest, motrin ( last taken 1 week ago) and tylenol  She has had similar pain and surgery to her right shoulder, .   She had left shoulder arthroscopy/decompression/acromioplasty done 7/2017 x 2     Right hand dominant     PCP Dr. JUAN CARLOS Davila LOV 6 months    Was planned to have done at [facility] --> changed to [facility] d/t \"equipment needs\"    Denies fever, chills or nausea.   Denies any past issues with anesthesia.        Vitals:    25 1410   BP: 132/74   Pulse: 73   Resp: 16   Temp: 36.3 °C (97.3 °F)   SpO2: 96%          Medical History[1]    Surgical History[2]    Patient  has no history on file for sexual activity.    Family History[3]    Allergies[4]    Prior to Admission medications    Medication Sig Start Date End Date Taking? Authorizing Provider   estradiol (Estrace) 2 mg tablet Take 1 tablet (2 mg) by mouth early in the morning.. 25  Yes Historical Provider, MD   HYDROcodone-acetaminophen (Norco) 5-325 mg tablet TAKE ONE TABLET BY MOUTH EVERY 4 HOURS AS NEEDED FOR PAIN FOR UP TO 7 DAYS. TAKE LOWEST DOSE " POSSIBLE TO MANAGE PAIN. MAX DAILY AMOUNT  6 TA 11/1/24  Yes Historical Provider, MD   metoprolol succinate XL (Toprol-XL) 25 mg 24 hr tablet Take 1 tablet (25 mg) by mouth twice a day. 6/12/24  Yes Historical Provider, MD   omeprazole (PriLOSEC) 40 mg DR capsule  11/16/15  Yes Historical Provider, MD   Premarin 1.25 mg tablet Take 1 tablet (1.25 mg) by mouth early in the morning.. 1/1/25  Yes Historical Provider, MD   atorvastatin (Lipitor) 10 mg tablet Take 1 tablet (10 mg) by mouth once daily.    Historical Provider, MD   fenofibrate (Tricor) 145 mg tablet Take 1 tablet (145 mg) by mouth once daily.    Historical Provider, MD   multivitamin tablet Take 1 tablet by mouth once daily.    Historical Provider, MD   PARoxetine (Paxil) 40 mg tablet Take 1 tablet (40 mg) by mouth once daily.    Historical Provider, MD   PARoxetine CR (Paxil-CR) 37.5 mg 24 hr tablet Take 1 tablet (37.5 mg) by mouth once daily.    Historical Provider, MD          Review of Systems  Constitutional: NO F, chills, or sweats  Eyes: glasses, no blurred vision or visual disturbance  ENT: denies congestion, sore throat, difficulty hearing  Cardiovascular: + palpitations, + edema, + SOB/SANCHEZ, no chest pain, no edema, no palps and no syncope.   Respiratory: no cough,no s.o.b. and no wheezing  Gastrointestinal: no abdominal pain, no N/V, no blood in stools  Genitourinary: no dysuria, no urinary frequency, no urinary hesitancy and no feelings of urinary urgency.   Musculoskeletal: no arthralgias,  no back pain and no myalgias.   Integumentary: no new skin lesions and no rashes.   Neurological: no difficulty walking, no headache, no limb weakness, no numbness and no tingling.   Endocrine: no recent weight gain and no recent weight loss.   Hematologic/Lymphatic: no tendency for easy bruising and no swollen glands.      Physical Exam  Constitutional:       Appearance: Normal appearance.   Cardiovascular:      Rate and Rhythm: Normal rate.      Heart  "sounds: Murmur heard.      Systolic murmur is present with a grade of 3/6.   Pulmonary:      Effort: Pulmonary effort is normal.      Breath sounds: Normal breath sounds.   Abdominal:      General: Bowel sounds are normal.      Palpations: Abdomen is soft.   Musculoskeletal:      Left shoulder: Tenderness present. Decreased range of motion. Decreased strength.      Cervical back: Normal range of motion.      Right lower le+ Edema present.      Left lower le+ Edema present.   Skin:     General: Skin is warm and dry.   Neurological:      Mental Status: She is alert and oriented to person, place, and time.          PAT AIRWAY:   Airway:     Mallampati::  II    TM distance::  <3 FB    Neck ROM::  Full  normal        Visit Vitals  /74   Pulse 73   Temp 36.3 °C (97.3 °F)   Resp 16   Ht 1.626 m (5' 4\")   Wt 99 kg (218 lb 4.1 oz)   SpO2 96%   BMI 37.46 kg/m²   Smoking Status Former   BSA 2.11 m²       DASI Risk Score      Flowsheet Row Pre-Admission Testing from [facility]25 in [facility]   Can you take care of yourself (eat, dress, bathe, or use toilet)?  2.75 filed at 2025 1413   Can you walk indoors, such as around your house? 1.75 filed at 2025 1413   Can you walk a block or two on level ground?  2.75 filed at 2025 1413   Can you climb a flight of stairs or walk up a hill? 5.5 filed at 2025 1413   Can you run a short distance? 8 filed at 2025 1413   Can you do light work around the house like dusting or washing dishes? 2.7 filed at 2025 1413   Can you do moderate work around the house like vacuuming, sweeping floors or carrying groceries? 3.5 filed at 2025 1413   Can you do heavy work around the house like scrubbing floors or lifting and moving heavy furniture?  8 filed at 2025 1413   Can you do yard work like raking leaves, weeding or pushing a mower? 4.5 filed at 2025 1413   Can you have sexual relations? 5.25 filed at 2025 1413   Can " you participate in moderate recreational activities like golf, bowling, dancing, doubles tennis or throwing a baseball or football? 0 filed at 05/07/2025 1413   Can you participate in strenous sports like swimming, singles tennis, football, basketball, or skiing? 0 filed at 05/07/2025 1413   DASI SCORE 44.7 filed at 05/07/2025 1413   METS Score (Will be calculated only when all the questions are answered) 8.2 filed at 05/07/2025 1413          Caprini DVT Assessment    No data to display       Modified Frailty Index    No data to display       [id number] Stroke Risk Points  Current as of just now        N/A 0 to 9 Points:      Last Change: N/A          The [id number] risk score (Lip MAGNO, et al. 2009. © 2010 [employer]) quantifies the risk of stroke for a patient with atrial fibrillation. For patients without atrial fibrillation or under the age of 18 this score appears as N/A. Higher score values generally indicate higher risk of stroke.        This score is not applicable to this patient. Components are not calculated.          Revised Cardiac Risk Index    No data to display       Apfel Simplified Score    No data to display       Risk Analysis Index Results This Encounter    No data found in the last 10 encounters.       Stop Bang Score      Flowsheet Row Pre-Admission Testing from 5/7/[facility] in [facility]   Do you snore loudly? 1 filed at 05/07/2025 1419   Do you often feel tired or fatigued after your sleep? 0 filed at 05/07/2025 1419   Has anyone ever observed you stop breathing in your sleep? 0 filed at 05/07/2025 1419   Do you have or are you being treated for high blood pressure? 0 filed at 05/07/2025 1419   Recent BMI (Calculated) 37.5 filed at 05/07/2025 1419   Is BMI greater than 35 kg/m2? 1=Yes filed at 05/07/2025 1419   Age older than 50 years old? 1=Yes filed at 05/07/2025 1419   Is your neck circumference greater than 17 inches (Male) or 16 inches (Female)? 1  filed at 05/07/2025 1419   Gender - Male 0=No filed at 05/07/2025 1419   STOP-BANG Total Score 4 filed at 05/07/2025 1419          Prodigy: High Risk  Total Score: 0          ARISCAT Score for Postoperative Pulmonary Complications      Flowsheet Row Pre-Admission Testing from 5/7/2025 in [facility]   Age Calculated Score 3 filed at 05/07/2025 1504   Preoperative SpO2 0 filed at 05/07/2025 1504   Respiratory infection in the last month Either upper or lower (i.e., URI, bronchitis, pneumonia), with fever and antibiotic treatment 0 filed at 05/07/2025 1504   Preoperative anemia (Hgb less than 10 g/dl) 0 filed at 05/07/2025 1504   Surgical incision  0 filed at 05/07/2025 1504   Duration of surgery  0 filed at 05/07/2025 1504   Emergency Procedure  0 filed at 05/07/2025 1504   ARISCAT Total Score  3 filed at 05/07/2025 1504          Dali Perioperative Risk for Myocardial Infarction or Cardiac Arrest (DONNA)      Flowsheet Row Pre-Admission Testing from 5/7/2025 in [facility]   Calculated Age Score 1.08 filed at 05/07/2025 1504   Functional Status  0 filed at 05/07/2025 1504   ASA Class  -3.29 filed at 05/07/2025 1504   Creatinine 0 filed at 05/07/2025 1504   Type of Procedure  0.80 filed at 05/07/2025 1504   DONNA Total Score  -6.66 filed at 05/07/2025 1504   DONNA % 0.13 filed at 05/07/2025 1504            ASSESSMENT  Obesity   BMI 37.46     GERD  Takes Omeprazole    HLD/HTN   Takes tricor, atorvastatin, metoprolol  Follows Dr. Davila  +LAURA, + palpitations, 1+ LE edema, denies chest pain pressure;  3+ systolic murmur  ECG reviewed from 4/25/2025- NSR, 72 bpm  Cardiac clearance- 1/2025    anxiety, depression  Takes paxil (states for post menopausal hot flashes)     Calcified shoulder/ rotator cuff tear  Plan for left shoulder arthroscopic rotator cuff repair as scheduled          ANESTHESIA FINDINGS:  Intubation History: No history of difficult intubation  Significant Anesthesia Considerations:       Airway History: No abnormal airway history  Predictors of Difficult Airway Management  ? STOP BANG 4  ? Obesity      CONSULTS:    Cardiology clearance Dr. Bills 4/23/2025     The Following Tests/Procedures Have Been Initiated and Reviewed/ interpreted by me:   CBC, BMP, ECG reviewed from 4/25/2025     Planned Anesthetic: general     Instructions Given to Patient:  *Reviewed Medications to be taken in AM of surgery or held  Patient given verbal and written preop instructions and voices comprehension and compliance.     No further testing required.      PLAN  This patient is optimally prepared for surgery.           [1]   Past Medical History:  Diagnosis Date    Anxiety     Aortic valve stenosis     Depression     Fibroid     GERD (gastroesophageal reflux disease)     Hyperlipidemia     Hypertension     Vision loss    [2]   Past Surgical History:  Procedure Laterality Date    BREAST RECONSTRUCTION Bilateral     reduction    CARDIAC CATHETERIZATION      COLONOSCOPY      FOOT SURGERY      LASIK      ROTATOR CUFF REPAIR      TONSILLECTOMY     [3]   Family History  Problem Relation Name Age of Onset    Heart disease Mother      Cancer Father     [4]   Allergies  Allergen Reactions    Amoxicillin-Pot Clavulanate Hives

## 2025-05-07 NOTE — PREPROCEDURE INSTRUCTIONS
Arrive 6:00am,   surgery at 7:30    When you arrive at the hospital, go to Registration on the ground floor.   You will need a responsible adult to drive you home.     Prior to surgery date:  One (1) week prior to surgery STOP:  -Stop aspirin products. Do not take NSAIDS/ Ibuprofen, Aleve, Motrin. Okay to take Tylenol or Acetaminophen. Do not take vitamins, supplements, herbals, diet pills.   -Stop these medications:   -No alcohol for 24 hours prior to surgery.  -No smoking 24 hours prior. No Marijuana, CBD oil, or Vaping 48 hours prior to surgery.  -Enjoy a light supper the evening before surgery.    Day of Surgery:  -Nothing to eat or drink after midnight. This includes food of any kind (including hard candy, cough drops, mints and gum, coffee).   -Have 13oz of Clear liquids 2hrs prior to Arrival time.   -No acrylic nails or nail polish on at least one fingernail; no polish on toes for foot surgery.   -No body piercing or jewelry.   -Shower as directed. No deodorant, lotion, power, oils, perfume, make-up.  -Mouthwash night before and morning of surgery.   -Wear loose comfortable clothing to accommodate your bandages.     -If  you have questions for PAT please call [phone].      Medication List            Accurate as of May 7, 2025  2:52 PM. Always use your most recent med list.                atorvastatin 10 mg tablet  Commonly known as: Lipitor  Medication Adjustments for Surgery: Take/Use as prescribed     estradiol 2 mg tablet  Commonly known as: Estrace  Medication Adjustments for Surgery: Do Not take on the morning of surgery     fenofibrate 145 mg tablet  Commonly known as: Tricor  Medication Adjustments for Surgery: Take last dose 1 day (24 hours) before surgery  Notes to patient: Hold the night before / tonight= Hold x 24 hours     magnesium chloride 70 mg EC tablet  Commonly known as: MagDelay  Medication Adjustments for Surgery: Do Not take on the morning of surgery     metoprolol succinate XL 25 mg  24 hr tablet  Commonly known as: Toprol-XL  Medication Adjustments for Surgery: Take on the morning of surgery     multivitamin tablet  Additional Medication Adjustments for Surgery: Take last dose 7 days before surgery  Notes to patient: Has not been taking     multivitamin tablet  Additional Medication Adjustments for Surgery: Take last dose 7 days before surgery     omeprazole 40 mg DR capsule  Commonly known as: PriLOSEC  Medication Adjustments for Surgery: Take on the morning of surgery     PARoxetine CR 37.5 mg 24 hr tablet  Commonly known as: Paxil-CR  Medication Adjustments for Surgery: Take/Use as prescribed

## 2025-05-07 NOTE — H&P (VIEW-ONLY)
"CPM/PAT Evaluation       Name: Cyndi Roa (Cyndi Roa)  /Age: 1970/55 y.o.     In-Person       Chief Complaint:     55 yr old female presents to [facility] for pre-operative evaluation, with c/o rotator cuff repair  LEFT SOULDER ARTHROSCOPIC ROTATOR CUFF REPAIR/ SUBACROMIAL SPACE DECOMPRESSION /  BICEPS TENDON TENODESIS /DEBRIDEMENT  is Scheduled on  2025 with Dr. Dozier    The patient has the following past medical history:  GERD, HLD/HTN, anxiety, depression      Chief complaint:  She states she gets calcium deposits in the shoulder that cause pain  She reports c/o left shoulder pain x  1 year+ that is worsening  She states she was hurt at work ---> had tendonitis to left forearm and xrays demonstrated rotator cuff tear   Pain located lateral and posterior aspect of left shoulder   Pain worsens with all movements:  lifting, dressing, driving  + weakness to LUE    She is managing pain with rest, motrin ( last taken 1 week ago) and tylenol  She has had similar pain and surgery to her right shoulder, .   She had left shoulder arthroscopy/decompression/acromioplasty done 7/2017 x 2     Right hand dominant     PCP Dr. JUAN CARLOS Davila LOV 6 months    Was planned to have done at [facility] --> changed to [facility] d/t \"equipment needs\"    Denies fever, chills or nausea.   Denies any past issues with anesthesia.        Vitals:    25 1410   BP: 132/74   Pulse: 73   Resp: 16   Temp: 36.3 °C (97.3 °F)   SpO2: 96%          Medical History[1]    Surgical History[2]    Patient  has no history on file for sexual activity.    Family History[3]    Allergies[4]    Prior to Admission medications    Medication Sig Start Date End Date Taking? Authorizing Provider   estradiol (Estrace) 2 mg tablet Take 1 tablet (2 mg) by mouth early in the morning.. 25  Yes Historical Provider, MD   HYDROcodone-acetaminophen (Norco) 5-325 mg tablet TAKE ONE TABLET BY MOUTH EVERY 4 HOURS AS NEEDED FOR PAIN FOR UP TO 7 DAYS. TAKE LOWEST DOSE " POSSIBLE TO MANAGE PAIN. MAX DAILY AMOUNT  6 TA 11/1/24  Yes Historical Provider, MD   metoprolol succinate XL (Toprol-XL) 25 mg 24 hr tablet Take 1 tablet (25 mg) by mouth twice a day. 6/12/24  Yes Historical Provider, MD   omeprazole (PriLOSEC) 40 mg DR capsule  11/16/15  Yes Historical Provider, MD   Premarin 1.25 mg tablet Take 1 tablet (1.25 mg) by mouth early in the morning.. 1/1/25  Yes Historical Provider, MD   atorvastatin (Lipitor) 10 mg tablet Take 1 tablet (10 mg) by mouth once daily.    Historical Provider, MD   fenofibrate (Tricor) 145 mg tablet Take 1 tablet (145 mg) by mouth once daily.    Historical Provider, MD   multivitamin tablet Take 1 tablet by mouth once daily.    Historical Provider, MD   PARoxetine (Paxil) 40 mg tablet Take 1 tablet (40 mg) by mouth once daily.    Historical Provider, MD   PARoxetine CR (Paxil-CR) 37.5 mg 24 hr tablet Take 1 tablet (37.5 mg) by mouth once daily.    Historical Provider, MD          Review of Systems  Constitutional: NO F, chills, or sweats  Eyes: glasses, no blurred vision or visual disturbance  ENT: denies congestion, sore throat, difficulty hearing  Cardiovascular: + palpitations, + edema, + SOB/SANCHEZ, no chest pain, no edema, no palps and no syncope.   Respiratory: no cough,no s.o.b. and no wheezing  Gastrointestinal: no abdominal pain, no N/V, no blood in stools  Genitourinary: no dysuria, no urinary frequency, no urinary hesitancy and no feelings of urinary urgency.   Musculoskeletal: no arthralgias,  no back pain and no myalgias.   Integumentary: no new skin lesions and no rashes.   Neurological: no difficulty walking, no headache, no limb weakness, no numbness and no tingling.   Endocrine: no recent weight gain and no recent weight loss.   Hematologic/Lymphatic: no tendency for easy bruising and no swollen glands.      Physical Exam  Constitutional:       Appearance: Normal appearance.   Cardiovascular:      Rate and Rhythm: Normal rate.      Heart  "sounds: Murmur heard.      Systolic murmur is present with a grade of 3/6.   Pulmonary:      Effort: Pulmonary effort is normal.      Breath sounds: Normal breath sounds.   Abdominal:      General: Bowel sounds are normal.      Palpations: Abdomen is soft.   Musculoskeletal:      Left shoulder: Tenderness present. Decreased range of motion. Decreased strength.      Cervical back: Normal range of motion.      Right lower le+ Edema present.      Left lower le+ Edema present.   Skin:     General: Skin is warm and dry.   Neurological:      Mental Status: She is alert and oriented to person, place, and time.          PAT AIRWAY:   Airway:     Mallampati::  II    TM distance::  <3 FB    Neck ROM::  Full  normal        Visit Vitals  /74   Pulse 73   Temp 36.3 °C (97.3 °F)   Resp 16   Ht 1.626 m (5' 4\")   Wt 99 kg (218 lb 4.1 oz)   SpO2 96%   BMI 37.46 kg/m²   Smoking Status Former   BSA 2.11 m²       DASI Risk Score      Flowsheet Row [facility]-Admission Testing [facility]25 [facility]   Can you take care of yourself (eat, dress, bathe, or use toilet)?  2.75 filed at 2025 1413   Can you walk indoors, such as around your house? 1.75 filed at 2025 1413   Can you walk a block or two on level ground?  2.75 filed at 2025 1413   Can you climb a flight of stairs or walk up a hill? 5.5 filed at 2025 1413   Can you run a short distance? 8 filed at 2025 1413   Can you do light work around the house like dusting or washing dishes? 2.7 filed at 2025 1413   Can you do moderate work around the house like vacuuming, sweeping floors or carrying groceries? 3.5 filed at 2025 1413   Can you do heavy work around the house like scrubbing floors or lifting and moving heavy furniture?  8 filed at 2025 1413   Can you do yard work like raking leaves, weeding or pushing a mower? 4.5 filed at 2025 1413   Can you have sexual relations? 5.25 filed at 2025 1413   Can " you participate in moderate recreational activities like golf, bowling, dancing, doubles tennis or throwing a baseball or football? 0 filed at 05/07/2025 1413   Can you participate in strenous sports like swimming, singles tennis, football, basketball, or skiing? 0 filed at 05/07/2025 1413   DASI SCORE 44.7 filed at 05/07/2025 1413   METS Score (Will be calculated only when all the questions are answered) 8.2 filed at 05/07/2025 1413          Caprini DVT Assessment    No data to display       Modified Frailty Index    No data to display       [id number] Stroke Risk Points  Current as of just now        N/A 0 to 9 Points:      Last Change: N/A          The [id number] risk score (Lip MAGNO, et al. 2009. © 2010 [employer]) quantifies the risk of stroke for a patient with atrial fibrillation. For patients without atrial fibrillation or under the age of 18 this score appears as N/A. Higher score values generally indicate higher risk of stroke.        This score is not applicable to this patient. Components are not calculated.          Revised Cardiac Risk Index    No data to display       Apfel Simplified Score    No data to display       Risk Analysis Index Results This Encounter    No data found in the last 10 encounters.       Stop Bang Score      Flowsheet Row Pre-Admission Testing from 5/7/2025 in [facility]   Do you snore loudly? 1 filed at 05/07/2025 1419   Do you often feel tired or fatigued after your sleep? 0 filed at 05/07/2025 1419   Has anyone ever observed you stop breathing in your sleep? 0 filed at 05/07/2025 1419   Do you have or are you being treated for high blood pressure? 0 filed at 05/07/2025 1419   Recent BMI (Calculated) 37.5 filed at 05/07/2025 1419   Is BMI greater than 35 kg/m2? 1=Yes filed at 05/07/2025 1419   Age older than 50 years old? 1=Yes filed at 05/07/2025 1419   Is your neck circumference greater than 17 inches (Male) or 16 inches (Female)? 1  filed at 05/07/2025 1419   Gender - Male 0=No filed at 05/07/2025 1419   STOP-BANG Total Score 4 filed at 05/07/2025 1419          Prodigy: High Risk  Total Score: 0          ARISCAT Score for Postoperative Pulmonary Complications      Flowsheet Row Pre-Admission Testing from 5/7/2025 in [facility]   Age Calculated Score 3 filed at 05/07/2025 1504   Preoperative SpO2 0 filed at 05/07/2025 1504   Respiratory infection in the last month Either upper or lower (i.e., URI, bronchitis, pneumonia), with fever and antibiotic treatment 0 filed at 05/07/2025 1504   Preoperative anemia (Hgb less than 10 g/dl) 0 filed at 05/07/2025 1504   Surgical incision  0 filed at 05/07/2025 1504   Duration of surgery  0 filed at 05/07/2025 1504   Emergency Procedure  0 filed at 05/07/2025 1504   ARISCAT Total Score  3 filed at 05/07/2025 1504          Dali Perioperative Risk for Myocardial Infarction or Cardiac Arrest (DONNA)      Flowsheet Row Pre-Admission Testing from 5/7/2025 in [facility]   Calculated Age Score 1.08 filed at 05/07/2025 1504   Functional Status  0 filed at 05/07/2025 1504   ASA Class  -3.29 filed at 05/07/2025 1504   Creatinine 0 filed at 05/07/2025 1504   Type of Procedure  0.80 filed at 05/07/2025 1504   DONNA Total Score  -6.66 filed at 05/07/2025 1504   DONNA % 0.13 filed at 05/07/2025 1504            ASSESSMENT  Obesity   BMI 37.46     GERD  Takes Omeprazole    HLD/HTN   Takes tricor, atorvastatin, metoprolol  Follows Dr. Davila  +LAURA, + palpitations, 1+ LE edema, denies chest pain pressure;  3+ systolic murmur  ECG reviewed from 4/25/2025- NSR, 72 bpm  Cardiac clearance- 1/2025    anxiety, depression  Takes paxil (states for post menopausal hot flashes)     Calcified shoulder/ rotator cuff tear  Plan for left shoulder arthroscopic rotator cuff repair as scheduled          ANESTHESIA FINDINGS:  Intubation History: No history of difficult intubation  Significant Anesthesia Considerations:       Airway History: No abnormal airway history  Predictors of Difficult Airway Management  ? STOP BANG 4  ? Obesity      CONSULTS:    Cardiology clearance Dr. Bills 4/23/2025     The Following Tests/Procedures Have Been Initiated and Reviewed/ interpreted by me:   CBC, BMP, ECG reviewed from 4/25/2025     Planned Anesthetic: general     Instructions Given to Patient:  *Reviewed Medications to be taken in AM of surgery or held  Patient given verbal and written preop instructions and voices comprehension and compliance.     No further testing required.      PLAN  This patient is optimally prepared for surgery.           [1]   Past Medical History:  Diagnosis Date    Anxiety     Aortic valve stenosis     Depression     Fibroid     GERD (gastroesophageal reflux disease)     Hyperlipidemia     Hypertension     Vision loss    [2]   Past Surgical History:  Procedure Laterality Date    BREAST RECONSTRUCTION Bilateral     reduction    CARDIAC CATHETERIZATION      COLONOSCOPY      FOOT SURGERY      LASIK      ROTATOR CUFF REPAIR      TONSILLECTOMY     [3]   Family History  Problem Relation Name Age of Onset    Heart disease Mother      Cancer Father     [4]   Allergies  Allergen Reactions    Amoxicillin-Pot Clavulanate Hives

## 2025-05-08 ENCOUNTER — ANESTHESIA (OUTPATIENT)
Dept: OPERATING ROOM | Facility: HOSPITAL | Age: 55
End: 2025-05-08
Payer: COMMERCIAL

## 2025-05-08 ENCOUNTER — HOSPITAL ENCOUNTER (OUTPATIENT)
Facility: HOSPITAL | Age: 55
Setting detail: OUTPATIENT SURGERY
Discharge: HOME | End: 2025-05-08
Attending: STUDENT IN AN ORGANIZED HEALTH CARE EDUCATION/TRAINING PROGRAM | Admitting: STUDENT IN AN ORGANIZED HEALTH CARE EDUCATION/TRAINING PROGRAM
Payer: COMMERCIAL

## 2025-05-08 ENCOUNTER — ANESTHESIA EVENT (OUTPATIENT)
Dept: OPERATING ROOM | Facility: HOSPITAL | Age: 55
End: 2025-05-08
Payer: COMMERCIAL

## 2025-05-08 ENCOUNTER — PHARMACY VISIT (OUTPATIENT)
Dept: PHARMACY | Facility: CLINIC | Age: 55
End: 2025-05-08
Payer: COMMERCIAL

## 2025-05-08 VITALS
SYSTOLIC BLOOD PRESSURE: 131 MMHG | TEMPERATURE: 97.7 F | DIASTOLIC BLOOD PRESSURE: 82 MMHG | HEART RATE: 68 BPM | RESPIRATION RATE: 16 BRPM | OXYGEN SATURATION: 93 %

## 2025-05-08 DIAGNOSIS — G89.18 POST-OP PAIN: Primary | ICD-10-CM

## 2025-05-08 LAB — STAPHYLOCOCCUS SPEC CULT: NORMAL

## 2025-05-08 PROCEDURE — 2720000007 HC OR 272 NO HCPCS: Performed by: STUDENT IN AN ORGANIZED HEALTH CARE EDUCATION/TRAINING PROGRAM

## 2025-05-08 PROCEDURE — 7100000010 HC PHASE TWO TIME - EACH INCREMENTAL 1 MINUTE: Performed by: STUDENT IN AN ORGANIZED HEALTH CARE EDUCATION/TRAINING PROGRAM

## 2025-05-08 PROCEDURE — 7100000009 HC PHASE TWO TIME - INITIAL BASE CHARGE: Performed by: STUDENT IN AN ORGANIZED HEALTH CARE EDUCATION/TRAINING PROGRAM

## 2025-05-08 PROCEDURE — 2500000004 HC RX 250 GENERAL PHARMACY W/ HCPCS (ALT 636 FOR OP/ED): Performed by: STUDENT IN AN ORGANIZED HEALTH CARE EDUCATION/TRAINING PROGRAM

## 2025-05-08 PROCEDURE — 3600000009 HC OR TIME - EACH INCREMENTAL 1 MINUTE - PROCEDURE LEVEL FOUR: Performed by: STUDENT IN AN ORGANIZED HEALTH CARE EDUCATION/TRAINING PROGRAM

## 2025-05-08 PROCEDURE — 3700000001 HC GENERAL ANESTHESIA TIME - INITIAL BASE CHARGE: Performed by: STUDENT IN AN ORGANIZED HEALTH CARE EDUCATION/TRAINING PROGRAM

## 2025-05-08 PROCEDURE — 2500000004 HC RX 250 GENERAL PHARMACY W/ HCPCS (ALT 636 FOR OP/ED)

## 2025-05-08 PROCEDURE — 7100000002 HC RECOVERY ROOM TIME - EACH INCREMENTAL 1 MINUTE: Performed by: STUDENT IN AN ORGANIZED HEALTH CARE EDUCATION/TRAINING PROGRAM

## 2025-05-08 PROCEDURE — RXMED WILLOW AMBULATORY MEDICATION CHARGE

## 2025-05-08 PROCEDURE — 2500000004 HC RX 250 GENERAL PHARMACY W/ HCPCS (ALT 636 FOR OP/ED): Mod: JZ | Performed by: ANESTHESIOLOGIST ASSISTANT

## 2025-05-08 PROCEDURE — 3700000002 HC GENERAL ANESTHESIA TIME - EACH INCREMENTAL 1 MINUTE: Performed by: STUDENT IN AN ORGANIZED HEALTH CARE EDUCATION/TRAINING PROGRAM

## 2025-05-08 PROCEDURE — C1713 ANCHOR/SCREW BN/BN,TIS/BN: HCPCS | Performed by: STUDENT IN AN ORGANIZED HEALTH CARE EDUCATION/TRAINING PROGRAM

## 2025-05-08 PROCEDURE — 2500000004 HC RX 250 GENERAL PHARMACY W/ HCPCS (ALT 636 FOR OP/ED): Mod: JZ | Performed by: ANESTHESIOLOGY

## 2025-05-08 PROCEDURE — A29828 PR ARTHROSCOPY SHOULDER SURGICAL BICEPS TENODESIS: Performed by: ANESTHESIOLOGY

## 2025-05-08 PROCEDURE — 7100000001 HC RECOVERY ROOM TIME - INITIAL BASE CHARGE: Performed by: STUDENT IN AN ORGANIZED HEALTH CARE EDUCATION/TRAINING PROGRAM

## 2025-05-08 PROCEDURE — 64415 NJX AA&/STRD BRCH PLXS IMG: CPT | Performed by: ANESTHESIOLOGY

## 2025-05-08 PROCEDURE — 3600000004 HC OR TIME - INITIAL BASE CHARGE - PROCEDURE LEVEL FOUR: Performed by: STUDENT IN AN ORGANIZED HEALTH CARE EDUCATION/TRAINING PROGRAM

## 2025-05-08 PROCEDURE — A29828 PR ARTHROSCOPY SHOULDER SURGICAL BICEPS TENODESIS: Performed by: ANESTHESIOLOGIST ASSISTANT

## 2025-05-08 PROCEDURE — 2780000003 HC OR 278 NO HCPCS: Performed by: STUDENT IN AN ORGANIZED HEALTH CARE EDUCATION/TRAINING PROGRAM

## 2025-05-08 PROCEDURE — 2500000005 HC RX 250 GENERAL PHARMACY W/O HCPCS: Mod: JZ | Performed by: STUDENT IN AN ORGANIZED HEALTH CARE EDUCATION/TRAINING PROGRAM

## 2025-05-08 DEVICE — LNT IMPLANT SYSTEM, 4.75 BC KL SWIVELOCK
Type: IMPLANTABLE DEVICE | Site: SHOULDER | Status: FUNCTIONAL
Brand: ARTHREX®

## 2025-05-08 RX ORDER — PROPOFOL 10 MG/ML
INJECTION, EMULSION INTRAVENOUS AS NEEDED
Status: DISCONTINUED | OUTPATIENT
Start: 2025-05-08 | End: 2025-05-08

## 2025-05-08 RX ORDER — LIDOCAINE HYDROCHLORIDE 40 MG/ML
INJECTION, SOLUTION RETROBULBAR AS NEEDED
Status: DISCONTINUED | OUTPATIENT
Start: 2025-05-08 | End: 2025-05-08

## 2025-05-08 RX ORDER — LIDOCAINE HYDROCHLORIDE 10 MG/ML
0.1 INJECTION, SOLUTION INFILTRATION; PERINEURAL ONCE
Status: DISCONTINUED | OUTPATIENT
Start: 2025-05-08 | End: 2025-05-08 | Stop reason: HOSPADM

## 2025-05-08 RX ORDER — MIDAZOLAM HYDROCHLORIDE 1 MG/ML
INJECTION, SOLUTION INTRAMUSCULAR; INTRAVENOUS
Status: COMPLETED
Start: 2025-05-08 | End: 2025-05-08

## 2025-05-08 RX ORDER — SODIUM CHLORIDE, SODIUM LACTATE, POTASSIUM CHLORIDE, CALCIUM CHLORIDE 600; 310; 30; 20 MG/100ML; MG/100ML; MG/100ML; MG/100ML
100 INJECTION, SOLUTION INTRAVENOUS CONTINUOUS
Status: ACTIVE | OUTPATIENT
Start: 2025-05-08 | End: 2025-05-08

## 2025-05-08 RX ORDER — ROCURONIUM BROMIDE 10 MG/ML
INJECTION, SOLUTION INTRAVENOUS AS NEEDED
Status: DISCONTINUED | OUTPATIENT
Start: 2025-05-08 | End: 2025-05-08

## 2025-05-08 RX ORDER — OXYCODONE HYDROCHLORIDE 5 MG/1
5 TABLET ORAL EVERY 6 HOURS PRN
Qty: 28 TABLET | Refills: 0 | Status: SHIPPED | OUTPATIENT
Start: 2025-05-08 | End: 2025-05-15

## 2025-05-08 RX ORDER — ONDANSETRON HYDROCHLORIDE 2 MG/ML
4 INJECTION, SOLUTION INTRAVENOUS ONCE AS NEEDED
Status: DISCONTINUED | OUTPATIENT
Start: 2025-05-08 | End: 2025-05-08 | Stop reason: HOSPADM

## 2025-05-08 RX ORDER — LABETALOL HYDROCHLORIDE 5 MG/ML
5 INJECTION, SOLUTION INTRAVENOUS ONCE AS NEEDED
Status: DISCONTINUED | OUTPATIENT
Start: 2025-05-08 | End: 2025-05-08 | Stop reason: HOSPADM

## 2025-05-08 RX ORDER — ACETAMINOPHEN 325 MG/1
650 TABLET ORAL EVERY 4 HOURS PRN
Status: DISCONTINUED | OUTPATIENT
Start: 2025-05-08 | End: 2025-05-08 | Stop reason: HOSPADM

## 2025-05-08 RX ORDER — LIDOCAINE HCL/PF 100 MG/5ML
SYRINGE (ML) INTRAVENOUS AS NEEDED
Status: DISCONTINUED | OUTPATIENT
Start: 2025-05-08 | End: 2025-05-08

## 2025-05-08 RX ORDER — MIDAZOLAM HYDROCHLORIDE 1 MG/ML
1 INJECTION, SOLUTION INTRAMUSCULAR; INTRAVENOUS ONCE AS NEEDED
Status: DISCONTINUED | OUTPATIENT
Start: 2025-05-08 | End: 2025-05-08 | Stop reason: HOSPADM

## 2025-05-08 RX ORDER — DOCUSATE SODIUM 100 MG/1
100 CAPSULE, LIQUID FILLED ORAL 2 TIMES DAILY PRN
Qty: 30 CAPSULE | Refills: 0 | Status: SHIPPED | OUTPATIENT
Start: 2025-05-08 | End: 2025-05-23

## 2025-05-08 RX ORDER — FENTANYL CITRATE 50 UG/ML
INJECTION, SOLUTION INTRAMUSCULAR; INTRAVENOUS AS NEEDED
Status: DISCONTINUED | OUTPATIENT
Start: 2025-05-08 | End: 2025-05-08

## 2025-05-08 RX ORDER — ALBUTEROL SULFATE 0.83 MG/ML
2.5 SOLUTION RESPIRATORY (INHALATION) ONCE AS NEEDED
Status: DISCONTINUED | OUTPATIENT
Start: 2025-05-08 | End: 2025-05-08 | Stop reason: HOSPADM

## 2025-05-08 RX ORDER — SCOPOLAMINE 1 MG/3D
PATCH, EXTENDED RELEASE TRANSDERMAL
Status: COMPLETED
Start: 2025-05-08 | End: 2025-05-08

## 2025-05-08 RX ORDER — ONDANSETRON HYDROCHLORIDE 2 MG/ML
INJECTION, SOLUTION INTRAVENOUS AS NEEDED
Status: DISCONTINUED | OUTPATIENT
Start: 2025-05-08 | End: 2025-05-08

## 2025-05-08 RX ORDER — CEFAZOLIN SODIUM 2 G/100ML
2 INJECTION, SOLUTION INTRAVENOUS ONCE
Status: COMPLETED | OUTPATIENT
Start: 2025-05-08 | End: 2025-05-08

## 2025-05-08 RX ORDER — FENTANYL CITRATE 50 UG/ML
INJECTION, SOLUTION INTRAMUSCULAR; INTRAVENOUS
Status: COMPLETED
Start: 2025-05-08 | End: 2025-05-08

## 2025-05-08 RX ORDER — HYDRALAZINE HYDROCHLORIDE 20 MG/ML
5 INJECTION INTRAMUSCULAR; INTRAVENOUS EVERY 30 MIN PRN
Status: DISCONTINUED | OUTPATIENT
Start: 2025-05-08 | End: 2025-05-08 | Stop reason: HOSPADM

## 2025-05-08 RX ORDER — MIDAZOLAM HYDROCHLORIDE 1 MG/ML
INJECTION, SOLUTION INTRAMUSCULAR; INTRAVENOUS AS NEEDED
Status: DISCONTINUED | OUTPATIENT
Start: 2025-05-08 | End: 2025-05-08

## 2025-05-08 RX ORDER — SODIUM CHLORIDE 0.9 G/100ML
INJECTION, SOLUTION IRRIGATION AS NEEDED
Status: DISCONTINUED | OUTPATIENT
Start: 2025-05-08 | End: 2025-05-08 | Stop reason: HOSPADM

## 2025-05-08 RX ADMIN — POVIDONE-IODINE 1 APPLICATION: 5 SOLUTION TOPICAL at 06:56

## 2025-05-08 RX ADMIN — DEXAMETHASONE SODIUM PHOSPHATE 4 MG: 4 INJECTION, SOLUTION INTRAMUSCULAR; INTRAVENOUS at 08:10

## 2025-05-08 RX ADMIN — ONDANSETRON 4 MG: 2 INJECTION INTRAMUSCULAR; INTRAVENOUS at 08:10

## 2025-05-08 RX ADMIN — ROCURONIUM BROMIDE 20 MG: 10 INJECTION INTRAVENOUS at 09:10

## 2025-05-08 RX ADMIN — CEFAZOLIN SODIUM 2 G: 2 INJECTION, SOLUTION INTRAVENOUS at 08:10

## 2025-05-08 RX ADMIN — PROPOFOL 150 MG: 10 INJECTION, EMULSION INTRAVENOUS at 07:43

## 2025-05-08 RX ADMIN — MIDAZOLAM 2 MG: 1 INJECTION INTRAMUSCULAR; INTRAVENOUS at 07:08

## 2025-05-08 RX ADMIN — SCOPOLAMINE: 1.5 PATCH, EXTENDED RELEASE TRANSDERMAL at 07:22

## 2025-05-08 RX ADMIN — ROCURONIUM BROMIDE 30 MG: 10 INJECTION INTRAVENOUS at 08:10

## 2025-05-08 RX ADMIN — SODIUM CHLORIDE, SODIUM LACTATE, POTASSIUM CHLORIDE, AND CALCIUM CHLORIDE: .6; .31; .03; .02 INJECTION, SOLUTION INTRAVENOUS at 07:30

## 2025-05-08 RX ADMIN — FENTANYL CITRATE 100 MCG: 50 INJECTION, SOLUTION INTRAMUSCULAR; INTRAVENOUS at 07:08

## 2025-05-08 RX ADMIN — ROCURONIUM BROMIDE 20 MG: 10 INJECTION INTRAVENOUS at 10:20

## 2025-05-08 RX ADMIN — PROPOFOL 50 MG: 10 INJECTION, EMULSION INTRAVENOUS at 08:11

## 2025-05-08 RX ADMIN — SUGAMMADEX 200 MG: 100 INJECTION, SOLUTION INTRAVENOUS at 11:18

## 2025-05-08 RX ADMIN — ROCURONIUM BROMIDE 50 MG: 10 INJECTION INTRAVENOUS at 07:43

## 2025-05-08 RX ADMIN — LIDOCAINE HYDROCHLORIDE 50 MG: 20 INJECTION INTRAVENOUS at 07:43

## 2025-05-08 RX ADMIN — ROCURONIUM BROMIDE 20 MG: 10 INJECTION INTRAVENOUS at 10:52

## 2025-05-08 RX ADMIN — LIDOCAINE HYDROCHLORIDE 3 ML: 40 INJECTION, SOLUTION RETROBULBAR; TOPICAL at 07:44

## 2025-05-08 SDOH — HEALTH STABILITY: MENTAL HEALTH: CURRENT SMOKER: 0

## 2025-05-08 ASSESSMENT — PAIN - FUNCTIONAL ASSESSMENT
PAIN_FUNCTIONAL_ASSESSMENT: 0-10

## 2025-05-08 ASSESSMENT — PAIN SCALES - GENERAL
PAINLEVEL_OUTOF10: 0 - NO PAIN

## 2025-05-08 ASSESSMENT — COLUMBIA-SUICIDE SEVERITY RATING SCALE - C-SSRS
6. HAVE YOU EVER DONE ANYTHING, STARTED TO DO ANYTHING, OR PREPARED TO DO ANYTHING TO END YOUR LIFE?: NO
2. HAVE YOU ACTUALLY HAD ANY THOUGHTS OF KILLING YOURSELF?: NO
1. IN THE PAST MONTH, HAVE YOU WISHED YOU WERE DEAD OR WISHED YOU COULD GO TO SLEEP AND NOT WAKE UP?: NO

## 2025-05-08 NOTE — DISCHARGE INSTRUCTIONS
Post-Operative Instructions for Arthroscopic Shoulder Surgery: Rotator Cuff Repair or Biceps Tenodesis       Pain Relief  You will receive a prescription for pain medication. Take it as directed. Scheduling your doses so that you take the medicine one-half hour before physical therapy can be helpful. If you have any side effects from the medication, discontinue its use and call our office. The use of cryotherapy also can be effective for relieving pain.    Activity, Sling and Exercises  Rest the day of your surgery. Placing a pillow under your forearm may provide comfort. Sleeping in a semi-upright position in a recliner or propped up by pillows may also be helpful.     Following surgery, your arm was placed in a sling for comfort and protection. The sling (including the waistband) should be worn at all times, especially in bed, for six weeks. You may intermittently remove the sling to shower, dress, and perform your exercises. You may remove your sling to perform the post-operative home exercises provided. These exercises are to simply carry you over until physical therapy starts two to three days following surgery.     Your rotator cuff (shoulder) muscle has been reattached to the arm bone and contraction of this muscle can pull out the repair. Therefore, do not use your shoulder muscles and refrain from actively moving (with its own muscles) your arm in any direction away from its resting position at the side of your body. Let the shoulder muscles relax like they are “dead” and use your other arm to support and gently move your surgical arm.    As you heal, your home activity level should never exceed that performed at therapy. Initial healing allows you to remove the sling after six weeks but it takes several months for complete healing to occur. If you lift something too heavy or use your arm in too strenuous a fashion, you may damage the healing repair and re-injure yourself. The marcus rule is that “if you  can do it in therapy, you can do it at home, but no more.    Modalities (Cold Therapy)   Cold is used to relieve pain and reduce inflammation. Cold should be applied for 10 to 15 minutes every two to three hours for inflammation and pain and immediately after any activity that aggravates your symptoms. Use ice packs or an ice massage with a cloth between the ice and your skin to prevent burning /freezing your skin.    Bandages  Remove the outer dressing three days after surgery, it may be soaked with bloody fluid. Leave in place any small adhesive Steri-Strips™ or sutures that are directly on the incision. You should redress the surgical wounds with new sterile gauze every 24 hours until the wounds and dressings are dry. Do not use bacitracin or any other ointments. Extra dressing materials (sterile gauze and tape) can be found at most pharmacies/drug stores.    Bathing  When the wounds are dry (no drainage) and it is at least three days following arthroscopic surgery, you may shower lightly. Do not run the water directly on the wounds and gently blot the wound dry immediately following. Do not submerge the wounds in a pool or bath for three weeks.    Armpit Hygiene  Good armpit hygiene is important to prevent aggressive skin surface fungal infections. One to two times per day, lean forward to allow your arm to dangle away from your body, wash the armpit with warm soapy water, gently towel dry, and place natural fiber material (cut piece of cotton T-shirt) in armpit crease to prevent skin-on-skin moisture. If your armpit skin turns red, itches or burns, you have developed a fungal skin infection. Don’t panic and continue armpit hygiene but also use a blower dryer (do not burn skin) to completely dry the skin after washes and add fungal powder or cream. If you develop redness, swelling, increased pain around the incisions, contact us as this may be a surgical wound infection.    Driving  Driving should be avoided  while you are wearing the sling or under the influence of pain medication.     Return to Work/School  You may return to light (sedentary) work or school the day after surgery if the pain is tolerable. Return to lifting or reaching work will be determined at your follow-up appointment.    Physical Therapy  You will be given a prescription for physical therapy at your first post operative appointment. Be certain to bring a copy of your specific physical therapy prescription to your first appointment.    Normal Symptoms and Findings After Surgery  Shoulder pain and warmth is normal.   Bloody drainage and limited areas of numbness may be present around the incisions.  Bruising and swelling distal to the shoulder (even in the hand) may occur.   Low-grade temperatures less than 101.5°F are common after surgery. Deep breathing exercises may be helpful.    Notify My Office [phone] Immediately If:  You develop fevers higher than 101.5°F, chills, or night sweats.  The wound turns red and the drainage increases.  The pain is not tolerable despite the use of methods described above.  You develop numbness or tingling.  You have wound drainage beyond five to seven days      Return Appointment  Call the office for an appointment if you do not already have one scheduled.      Dr. Dozier’s Office Numbers:   - [phone]  [facility] [phone]  [place] - [phone]

## 2025-05-08 NOTE — ANESTHESIA PREPROCEDURE EVALUATION
Patient: Cyndi Roa    Procedure Information       Date/Time: 05/08/25 0730    Procedures:       LEFT SOULDER ARTHROSCOPIC ROTATOR CUFF REPAIR (Left: Shoulder) - LEFT SHOULDER ARTHROSCOPIC ROTATOR CUFF REPAIR, LEFT SHOULDER ARTHROSCOPIC SUBACROMIAL DECOMPRESSION, LEFT SHOULDER ARTHROSCOPIC BICEPS TENODESIS, LEFT SHOULDER ARTHRHOSCOPIC DEBRIDEMENT      SUBACROMIAL SPACE DECOMPRESSION (Left: Shoulder)      BICEPS TENDON TENODESIS (Left: Shoulder)      DEBRIDEMENT (Left: Shoulder)    Location: [facility] 08 / [facility]    Surgeons: Donte Dozier, DO            Relevant Problems   Anesthesia (within normal limits)       Clinical information reviewed:    Allergies  Meds     OB Status           NPO Detail:  NPO/Void Status  Date of Last Liquid: 05/08/25  Time of Last Liquid: 0430  Date of Last Solid: 05/07/25  Time of Last Solid: 1600  Last Intake Type: Clear fluids         Physical Exam    Airway  Mallampati: II  TM distance: >3 FB  Neck ROM: full  Mouth opening: 3 or more finger widths     Cardiovascular   Rhythm: regular     Dental - normal exam     Pulmonary    Abdominal            Anesthesia Plan    History of general anesthesia?: yes  History of complications of general anesthesia?: no    ASA 2     general and regional     The patient is not a current smoker.  Patient was not previously instructed to abstain from smoking on day of procedure.  Patient did not smoke on day of procedure.    intravenous induction   Anesthetic plan and risks discussed with patient.  Use of blood products discussed with patient who.    Plan discussed with [facility].

## 2025-05-08 NOTE — ANESTHESIA POSTPROCEDURE EVALUATION
Patient: Cyndi Roa    Procedure Summary       Date: 05/08/25 Room / Location: [facility] / [facility]    Anesthesia Start: 0734 Anesthesia Stop: 1133    Procedures:       LEFT SOULDER ARTHROSCOPIC ROTATOR CUFF REPAIR (Left: Shoulder)      SUBACROMIAL SPACE DECOMPRESSION (Left: Shoulder)      BICEPS TENDON TENODESIS (Left: Shoulder)      DEBRIDEMENT (Left: Shoulder) Diagnosis:       Tear of left glenoid labrum, initial encounter      Partial nontraumatic tear of rotator cuff, left      Calcific tendinitis of left shoulder      (LEFT SHOULDER CALCIFIC TENDINITIS M75.32)      (LEFT SHOULDER ROTATOR CUFF TEAR M75.112, LEFT SHOULDER LABRUM TEAR S43.432A)    Surgeons: Donte Dozier DO Responsible Provider: Neal Steward MD    Anesthesia Type: general, regional ASA Status: 2            Anesthesia Type: general, regional    Vitals Value Taken Time   /63 05/08/25 11:31   Temp 36.4 05/08/25 11:33   Pulse 69 05/08/25 11:32   Resp 14 05/08/25 11:33   SpO2 100 % 05/08/25 11:32   Vitals shown include unfiled device data.    Anesthesia Post Evaluation    Patient participation: complete - patient participated  Level of consciousness: awake  Pain management: adequate  Airway patency: patent  Cardiovascular status: acceptable  Respiratory status: acceptable  Hydration status: acceptable  Postoperative Nausea and Vomiting: none        No notable events documented.

## 2025-05-08 NOTE — OP NOTE
LEFT SOULDER ARTHROSCOPIC ROTATOR CUFF REPAIR (L), SUBACROMIAL SPACE DECOMPRESSION (L), BICEPS TENDON TENODESIS (L), DEBRIDEMENT (L) Operative Note     Date: 2025  OR Location: [facility]    Name: Cyndi Roa, : 1970, Age: 55 y.o., MRN: [id number], Sex: female    Diagnosis  Pre-op Diagnosis      * Tear of left glenoid labrum, initial encounter [S43.432A]     * Partial nontraumatic tear of rotator cuff, left [M75.112]     * Calcific tendinitis of left shoulder [M75.32] Post-op Diagnosis     * Tear of left glenoid labrum, initial encounter [S43.432A]     * Partial nontraumatic tear of rotator cuff, left [M75.112]     * Calcific tendinitis of left shoulder [M75.32]     Procedures  LEFT SOULDER ARTHROSCOPIC ROTATOR CUFF REPAIR  28873 - [facility] SURGICAL ARTHROSCOPY SHOULDER W/ROTATOR CUFF RPR    SUBACROMIAL SPACE DECOMPRESSION  38351 - [facility]GICAL ARTHROSCOPY ILANA W/CORACOACRM LIGM RLS    BICEPS TENDON TENODESIS  22476 - [facility] SURGICAL ARTHROSCOPY SHOULDER BICEPS TENODESIS    DEBRIDEMENT  45911 - [place] SURGICAL ARTHROSCOPY SHOULDER XTNSV DBRDMT 3+      Surgeons      * Donte Dozier - Primary    Resident/Fellow/Other Assistant:  Surgeons and Role:  * No surgeons found with a matching role *    Staff:   Circulator: Amparo Espino Person: Valdo  Surgical Assistant: Sara    Anesthesia Staff: Anesthesiologist: Neal Steward MD  C-AA: DORINA Taylor    Procedure Summary  Anesthesia: Regional, General  ASA: II  Estimated Blood Loss: 5mL  Intra-op Medications: * Intraprocedure medication information is unavailable because the case start and end events have not been set *           Anesthesia Record               Intraprocedure I/O Totals          Intake    LR bolus 800.00 mL    Total Intake 800 mL       Output    Est. Blood Loss 10 mL    Total Output 10 mL       Net    Net Volume 790 mL          Specimen: No specimens collected              Drains and/or Catheters: * None in log *    Tourniquet Times:          Implants:  Implants       Type Name Action Serial No.       ARTHREX KNOTLESS TENODESIS IMPLANT SYS W/ 4.75MM BIOCOMPOSITE SWIVELOCK ANCHOR, FIBERLINK SUTURETAPE, PUNCH, AND LOOP N TACK SWIFTSTITCH SUTURE PASSER Implanted               Findings: Calcium deposition within the infraspinatus tendon near the greater tuberosity, type II SLAP tear with erythema of the long head of the biceps tendon within the bicipital groove, bursal sided near full-thickness tear of the infraspinatus tendon following debridement of the calcific tendinitis necessitating repair    Indications: Cyndi Roa is an 55 y.o. female who is having surgery for LEFT SHOULDER CALCIFIC TENDINITIS M75.32  LEFT SHOULDER ROTATOR CUFF TEAR M75.112, LEFT SHOULDER LABRUM TEAR S43.432A.     The patient was seen in the preoperative area. The risks, benefits, complications, treatment options, non-operative alternatives, expected recovery and outcomes were discussed with the patient. The possibilities of reaction to medication, pulmonary aspiration, injury to surrounding structures, bleeding, recurrent infection, the need for additional procedures, failure to diagnose a condition, and creating a complication requiring transfusion or operation were discussed with the patient. The patient concurred with the proposed plan, giving informed consent.  The site of surgery was properly noted/marked if necessary per policy. The patient has been actively warmed in preoperative area. Preoperative antibiotics have been ordered and given within 1 hours of incision. Venous thrombosis prophylaxis have been ordered including bilateral sequential compression devices    Procedure Details:   The patient was identified in the preoperative holding area where consent was obtained and the operative extremity was marked.  A peripheral nerve block was performed by anesthesia staff.  Patient was then brought back to the operating room by OR staff.  At this point an endotracheal  intubation was performed.  The patient was positioned in the beachchair position with all bony prominences well-padded and peripheral nerves free of compression.  The head was secured to the operative table via the beach chair attachment, taking care to maintain neutral position of the head and neck.  The surgical extremity was prepped and draped in normal sterile fashion.  Preoperative antibiotics were administered.  A timeout was conducted confirming correct patient name, date of birth, laterality, procedure, allergies, and antibiotics.      Diagnostic Arthroscopy:    Humeral head cartilage: Grade 2 changes  Glenoid cartilage: Grade 2 changes  Labrum: Type II SLAP tear with left off of the glenoid labrum at the biceps anchor with extension anteriorly and degenerative fraying of both the anterior and posterior labrum  Long head of biceps tendon: Erythematous without tear  Subscapularis tendon: Partial-thickness upper border tearing, grossly intact  Undersurface supraspinatus and infraspinatus tendon: Intact  Subacromial space: Significant calcium deposition from calcific tendinitis within the infraspinatus tendon near the insertion on the greater tuberosity.  Near full-thickness bursal sided rotator cuff tear following debridement of calcium deposition.  Additional findings: Evidence of prior subacromial decompression, no significant calcium deposition found within the upper border of the subscapularis      All bony landmarks and portal sites were marked out in standard fashion.  The glenohumeral joint was insufflated with 60 cc of normal saline via an 18-gauge spinal needle utilizing the standard posterior portal.  Posterior viewing portal was then established with an 11 blade scalpel and arthroscope was introduced.  Diagnostic arthroscopy was then carried out with the above findings:     A standard anterior portal was then needle localized and established with an 11 blade scalpel followed by a switching stick and  hard body cannula.      Given the hyperemia and fraying of the biceps tendon and lift off of the superior labrum at the biceps anchor, decision was made to proceed with biceps tenodesis.  A free fiber link suture was passed around the proximal aspect of the long head of the biceps tendon in a luggage tag type fashion and subsequently penetrated distally with the Johnson Stitch suture passer, completing the loop and tac fixation.  The biceps was then tenotomized from the superior attachment site of the glenoid labrum.  And the remaining stump was debrided with a combination of arthroscopic shaver and ArthroCare wand.  The suture was then loaded into a 4.75 mm swivel lock anchor which was punched and impacted into place in the superior aspect of the bicipital groove, just off of the lateral margin of the articular cartilage and superior border of the subscapularis tendon.  The suture was then cut and any biceps tendon stump was then carefully debrided with a ArthroCare wand.     We then turned our attention to the subacromial space.  The arthroscopic was inserted into the subacromial space and a lateral portal was then established with spinal needle and 11 blade scalpel.  A shaver was introduced and a bursectomy was performed identifying anterior lateral corner of the acromion and underlying rotator cuff tendon.     The area of known calcific tendinitis based on preoperative imaging was thoroughly inspected and a 18-gauge spinal needle was used to penetrate holes within the area of concern confirming the areas of calcium deposition within the infraspinatus tendon.  Multiple holes were made within the infraspinatus tendon area of calcium deposition with a spinal needle and a blunt shaver was utilized to milk as much of the calcium out of the tendon without disrupting the integrity.  Given the amount of calcium deposition, 11 blade scalpel was also used to make a small longitudinal incision within the infraspinatus tendon  in line with the fibers for appropriate expression of calcium deposition.  We then debrided the remaining portions of the calcific tendinitis until no further calcifications were seen or expressible.  We then turned attention to the subscapularis anteriorly where there was some possible concern for calcifications seen on preoperative imaging.  This area was thoroughly investigated and probed with both blunt instruments as well as spinal needle and no areas of calcification were identified.    Attention was then turned back to the infraspinatus tendon.  Following the debridement of the calcific tendinitis a near full-thickness tear of the infraspinatus tendon measuring approximately 1 cm in width was noted.  The rotator cuff tear was then debrided of any nonviable appearing tissue with a combination of shaver and arthroscopic biter.  The greater tuberosity was then prepared for repair with an arthroscopic bone cutting shaver down to a bleeding bone bed.  We then proceeded with a 1x1 speed bridge repair construct in standard fashion.  One 2.6 mm Arthrex Fibertak RC all suture anchor with knotless mechanism was placed into the medial aspect of the rotator cuff footprint just off the lateral margin of the articular cartilage through a percutaneous portal.  The sweat Zhou the suture tapes was cut to allow for individual passage of each limb of the fiber tapes through the anterior and posterior aspects of the tear.  The suture tapes were then sequentially passed through the rotator cuff tissue with a scorpion suture passer.  The repair stitch from the knotless mechanism was also passed through the rotator cuff tissue in the same fashion.  The knotless mechanism was then converted and tension initially pulled to the repair stitch reducing the tear back down to the greater tuberosity footprint.  We then took the 2 limbs of the fiber tapes into a self punching 4.75 mm swivel lock lateral row anchor after applying appropriate  tension to achieve successful reduction down to the footprint.  [place] was impacted and inserted in standard fashion. Suture limbs were then cut.  The knotless mechanism suture was then retensioned for additional fixation of the repair.  Excellent fixation of the rotator cuff tendon was achieved down to the greater tuberosity.  Final arthroscopic images were then obtained and all fluid evacuated from the shoulder.  The arthroscope was then removed and portal sites closed with buried 3-0 Monocryl suture.  Steri-Strips were then applied over the portal sites.     The shoulder was cleansed and a sterile dressing was applied with xeroform, 4x4s, ABDs, and foam tape.  The patient was placed into an UltraSling, awoken from anesthesia and transferred to PACU in stable condition.  Wound class was clean.  All counts were correct.  Case was elective.      Evidence of Infection: No   Complications:  None; patient tolerated the procedure well.    Disposition: PACU - hemodynamically stable.  Condition: stable     Task Performed by RNFA or Surgical Assistant:  Positioning, prepping, draping, instrumentation    Additional Details: None    Attending Attestation: I performed the procedure.      Post-Operative Plan:   - Nonweightbearing to the operative extremity and UltraSling at all times  - Ok for gentle elbow, wrist, digital range of motion  - Oxycodone 5mg every 6 hours as needed for pain on discharge  - Plan for discharge home from PACU  - Follow up outpatient for wound check in 7-10 days      Donte Dozier DO  Orthopaedic Surgery  Sports Medicine & Shoulder  [facility]   [phone]

## (undated) DEVICE — BANDAGE COMPR W4INXL5FT E SGL LAYERED CLP CLSR PREM CONTEX

## (undated) DEVICE — Device

## (undated) DEVICE — SHEET,DRAPE,53X77,STERILE: Brand: MEDLINE

## (undated) DEVICE — CANNULA, ARTHROSCOPIC TWIST-IN 7MM

## (undated) DEVICE — STRIP, SKIN CLOSURE, STERI STRIP, REINFORCED, 0.5 X 4 IN

## (undated) DEVICE — KIT, DISPOSABLES, FOR 2.6 FIBERTAK

## (undated) DEVICE — KIT SURG OINT ST WET UNIV PREP BDINE

## (undated) DEVICE — DRAPE, SHEET, THREE QUARTER, FAN FOLD, 57 X 77 IN

## (undated) DEVICE — SKIN MARKER,REGULAR TIP WITH RULER: Brand: DEVON

## (undated) DEVICE — 4-PORT MANIFOLD: Brand: NEPTUNE 2

## (undated) DEVICE — SUTURE VICRYL + SZ 4-0 L27IN ABSRB WHT FS-2 3/8 CIR REV CUT VCP422H

## (undated) DEVICE — TUBING, CLEAR N-COND, 5MM X 10, LF

## (undated) DEVICE — TOPAZ ICW: Brand: COBLATION

## (undated) DEVICE — DRAPE, INSTRUMENT, W/POUCH, STERI DRAPE, 7 X 11 IN, DISPOSABLE, STERILE

## (undated) DEVICE — DRAPE, SHEET, U, W/ADHESIVE STRIP, IMPERVIOUS, 60 X 70 IN, DISPOSABLE, LF, STERILE

## (undated) DEVICE — DRAPE, INCISE, ANTIMICROBIAL, IOBAN 2, LARGE, 17 X 23 IN, DISPOSABLE, STERILE

## (undated) DEVICE — INTENDED FOR TISSUE SEPARATION, AND OTHER PROCEDURES THAT REQUIRE A SHARP SURGICAL BLADE TO PUNCTURE OR CUT.: Brand: BARD-PARKER ® CARBON RIB-BACK BLADES

## (undated) DEVICE — APPLICATOR MEDICATED 26 CC SOLUTION HI LT ORNG CHLORAPREP

## (undated) DEVICE — STOCKINETTE, IMPERVIOUS, 9 X 36 IN, STERILE

## (undated) DEVICE — DRESSING, ABDOMINAL, TENDERSORB, 8 X 7-1/2 IN, STERILE

## (undated) DEVICE — APPLICATOR, CHLORAPREP, W/ORANGE TINT, 26ML

## (undated) DEVICE — DRESSING, GAUZE, PETROLATUM, PATCH, XEROFORM, 1 X 8 IN, STERILE

## (undated) DEVICE — PROBE, APOLLO RF, 90 DEG, EXTRA LARTGE

## (undated) DEVICE — TOPAZ EZ MICRODEBRIDER COBLATION WAND WITH INTEGRATED FINGER SWITCH IFS: Brand: COBLATION

## (undated) DEVICE — TUBING, PATIENT 8FT STERILE

## (undated) DEVICE — BANDAGE,GAUZE,BULKEE II,4.5"X4.1YD,STRL: Brand: MEDLINE

## (undated) DEVICE — DRAPE, TIBURON, SPLIT SHEET, REINF ADH STRIP, 77X108

## (undated) DEVICE — NEEDLE, SPINAL, QUINCKE, 18 G X 3.5 IN, PINK HUB

## (undated) DEVICE — DRESSING PETRO W7.6XL7.6CM CELOS ACETT NONADHERING MESH

## (undated) DEVICE — BURR, STRYKER, 5.5MM, BRL 6FLT

## (undated) DEVICE — DRESSING, GAUZE, SPONGE, 12 PLY, CURITY, 4 X 4 IN, RIGID TRAY, STERILE

## (undated) DEVICE — CANNULA, BUTTON, PASSPORT, 8MM X 5CM

## (undated) DEVICE — COVER, MAYO STAND, W/PAD, 23 IN, DISPOSABLE, PLASTIC, LF, STERILE

## (undated) DEVICE — NEEDLE, SCORPION, HD

## (undated) DEVICE — DRESSING GZ W1XL8IN COT XRFRM N ADH OVERWRAP CURAD

## (undated) DEVICE — ADAPTER, Y TUBING STERILE

## (undated) DEVICE — BANDAGE, COFLEX, 6 X 5 YDS, FOAM TAN, STERILE, LF

## (undated) DEVICE — KIT, STABILIZATION SHOULDER

## (undated) DEVICE — BRACE WALKING EMBEDDED SOLE MED STD ANK ROCKER BTM INLINE

## (undated) DEVICE — BLADE, STRYKER, 4.0MM, RESECTOR, F-SERIES

## (undated) DEVICE — TUBING, PUMP REDEUCE 8FT STERILE

## (undated) DEVICE — SLEEVE, VASO PRESS, CALF GARMENT, MEDIUM, GREEN